# Patient Record
Sex: FEMALE | Race: WHITE | NOT HISPANIC OR LATINO | Employment: OTHER | URBAN - METROPOLITAN AREA
[De-identification: names, ages, dates, MRNs, and addresses within clinical notes are randomized per-mention and may not be internally consistent; named-entity substitution may affect disease eponyms.]

---

## 2017-08-24 ENCOUNTER — OFFICE VISIT (OUTPATIENT)
Dept: UROLOGY | Facility: CLINIC | Age: 57
End: 2017-08-24
Payer: COMMERCIAL

## 2017-08-24 ENCOUNTER — APPOINTMENT (OUTPATIENT)
Dept: LAB | Facility: HOSPITAL | Age: 57
End: 2017-08-24
Attending: UROLOGY
Payer: COMMERCIAL

## 2017-08-24 VITALS
DIASTOLIC BLOOD PRESSURE: 74 MMHG | HEIGHT: 66 IN | TEMPERATURE: 99 F | BODY MASS INDEX: 22.66 KG/M2 | WEIGHT: 141 LBS | SYSTOLIC BLOOD PRESSURE: 115 MMHG | HEART RATE: 76 BPM

## 2017-08-24 DIAGNOSIS — N32.81 OAB (OVERACTIVE BLADDER): Primary | ICD-10-CM

## 2017-08-24 DIAGNOSIS — R31.9 HEMATURIA OF UNDIAGNOSED CAUSE: ICD-10-CM

## 2017-08-24 PROCEDURE — 3008F BODY MASS INDEX DOCD: CPT | Mod: S$GLB,,, | Performed by: UROLOGY

## 2017-08-24 PROCEDURE — 88112 CYTOPATH CELL ENHANCE TECH: CPT | Performed by: PATHOLOGY

## 2017-08-24 PROCEDURE — 99999 PR PBB SHADOW E&M-EST. PATIENT-LVL III: CPT | Mod: PBBFAC,,, | Performed by: UROLOGY

## 2017-08-24 PROCEDURE — 88112 CYTOPATH CELL ENHANCE TECH: CPT | Mod: 26,,, | Performed by: PATHOLOGY

## 2017-08-24 PROCEDURE — 99212 OFFICE O/P EST SF 10 MIN: CPT | Mod: S$GLB,,, | Performed by: UROLOGY

## 2017-08-24 RX ORDER — OXYBUTYNIN CHLORIDE 5 MG/1
5 TABLET, EXTENDED RELEASE ORAL DAILY
Qty: 30 TABLET | Refills: 11 | Status: SHIPPED | OUTPATIENT
Start: 2017-08-24 | End: 2018-11-09 | Stop reason: SDUPTHER

## 2017-08-24 NOTE — ADDENDUM NOTE
Encounter addended by: Kimberly Grant MA on: 8/24/2017 11:54 AM<BR>    Actions taken: Child order released for a procedure order, Multistep and multistep collection tasks completed

## 2017-08-24 NOTE — PROGRESS NOTES
Subjective:       Patient ID: Ani Matthews is a 57 y.o. female.    Chief Complaint:   OFFICE NOTE    CHIEF COMPLAINT:  Microhematuria and history of overactive bladder.    Ms. Matthews is a 57-year-old female who in 2015, she underwent a cystoscopic   evaluation for evaluation of microhematuria and an abnormal urine cytology.  The   only finding was that the patient have an overactive bladder and the patient is   being managed with oxybutynin XL 5 mg p.o. daily, but she takes that only as   needed.  She refers that do not take more than two or three of these medications   every week.  The patient is doing well.  She is not having any significant   problems.  Denies dysuria.  Denies gross hematuria.  She has nocturia x0, have   urgency during the day and frequency during the day.  During the last year, she   do not have a single a urinary tract infection.    The past medical and surgical history are well documented in the medical record   and these and the medications were reviewed by me during this visit.    The urinalysis today shows 1+ leukocyte, negative nitrites, and 2+ blood.      EOR/PN  dd: 08/24/2017 10:05:47 (CDT)  td: 08/24/2017 12:24:44 (CDT)  Doc ID   #[id number]  Job ID #[id number]    CC:       HPI  Review of Systems   Constitutional: Negative.  Negative for activity change.   HENT: Negative.  Negative for facial swelling.    Eyes: Negative for discharge.   Respiratory: Negative for cough and shortness of breath.    Cardiovascular: Negative for chest pain and palpitations.   Gastrointestinal: Negative for abdominal distention, blood in stool and constipation.   Genitourinary: Positive for frequency and urgency. Negative for dysuria, flank pain, hematuria and vaginal pain.   Musculoskeletal: Negative.  Negative for arthralgias.   Skin: Negative.    Neurological: Negative.  Negative for dizziness.   Hematological: Negative for adenopathy.   Psychiatric/Behavioral: The patient is not nervous/anxious.         Objective:      Physical Exam   Constitutional: She appears well-developed.   HENT:   Head: Normocephalic.   Eyes: Pupils are equal, round, and reactive to light.   Neck: Normal range of motion.   Cardiovascular: Normal rate.    Pulmonary/Chest: Effort normal.   Abdominal: Soft. She exhibits no distension and no mass. There is no tenderness. Hernia confirmed negative in the right inguinal area and confirmed negative in the left inguinal area.   Genitourinary: No erythema, tenderness or bleeding in the vagina.   Musculoskeletal: Normal range of motion.   Neurological: She is alert.   Skin: Skin is warm.     Psychiatric: She has a normal mood and affect.       Assessment:       1. OAB (overactive bladder)    2. Hematuria of undiagnosed cause        Plan:       OAB (overactive bladder)  -     POCT URINE DIPSTICK WITHOUT MICROSCOPE  -     Cytology, urine; Future; Expected date: 08/24/2017    Hematuria of undiagnosed cause  -     Cytology, urine; Future; Expected date: 08/24/2017    Other orders  -     oxybutynin (DITROPAN XL) 5 MG TR24; Take 1 tablet (5 mg total) by mouth once daily.  Dispense: 30 tablet; Refill: 11    Keep same management. RTC 1 yr

## 2018-11-12 RX ORDER — OXYBUTYNIN CHLORIDE 5 MG/1
5 TABLET, EXTENDED RELEASE ORAL DAILY
Qty: 90 TABLET | Refills: 0 | Status: SHIPPED | OUTPATIENT
Start: 2018-11-12 | End: 2019-02-10

## 2018-11-12 RX ORDER — OXYBUTYNIN CHLORIDE 5 MG/1
TABLET, EXTENDED RELEASE ORAL
Qty: 30 TABLET | Refills: 0 | Status: SHIPPED | OUTPATIENT
Start: 2018-11-12 | End: 2018-11-12 | Stop reason: SDUPTHER

## 2018-11-12 NOTE — TELEPHONE ENCOUNTER
----- Message from Nohelia Roche sent at 11/12/2018 10:01 AM CST -----  Please call pt at  [phone]  Asking for 1 refill for oxybutynin (DITROPAN XL) 5 MG TR24  / not sure who her new insurance will allow her to see   [id number] - [place] - [address]  [address] [address]  Phone: [phone] Fax: [phone]

## 2019-02-19 ENCOUNTER — OFFICE VISIT (OUTPATIENT)
Dept: UROGYNECOLOGY | Facility: CLINIC | Age: 59
End: 2019-02-19
Payer: COMMERCIAL

## 2019-02-19 ENCOUNTER — APPOINTMENT (OUTPATIENT)
Dept: LAB | Facility: HOSPITAL | Age: 59
End: 2019-02-19
Attending: OBSTETRICS & GYNECOLOGY
Payer: COMMERCIAL

## 2019-02-19 VITALS
DIASTOLIC BLOOD PRESSURE: 79 MMHG | WEIGHT: 140.19 LBS | BODY MASS INDEX: 22.53 KG/M2 | SYSTOLIC BLOOD PRESSURE: 117 MMHG | HEART RATE: 96 BPM | HEIGHT: 66 IN

## 2019-02-19 DIAGNOSIS — N36.2 URETHRAL CARUNCLE: ICD-10-CM

## 2019-02-19 DIAGNOSIS — R35.0 URINARY FREQUENCY: ICD-10-CM

## 2019-02-19 DIAGNOSIS — R31.21 ASYMPTOMATIC MICROSCOPIC HEMATURIA: Primary | ICD-10-CM

## 2019-02-19 DIAGNOSIS — N32.81 OVERACTIVE BLADDER: ICD-10-CM

## 2019-02-19 LAB
BILIRUB SERPL-MCNC: ABNORMAL MG/DL
BLOOD URINE, POC: ABNORMAL
COLOR, POC UA: YELLOW
GLUCOSE UR QL STRIP: ABNORMAL
KETONES UR QL STRIP: ABNORMAL
LEUKOCYTE ESTERASE URINE, POC: ABNORMAL
NITRITE, POC UA: ABNORMAL
PH, POC UA: 5
PROTEIN, POC: ABNORMAL
SPECIFIC GRAVITY, POC UA: 1.02
UROBILINOGEN, POC UA: ABNORMAL

## 2019-02-19 PROCEDURE — 81002 POCT URINE DIPSTICK WITHOUT MICROSCOPE: ICD-10-PCS | Mod: S$GLB,,, | Performed by: OBSTETRICS & GYNECOLOGY

## 2019-02-19 PROCEDURE — 81002 URINALYSIS NONAUTO W/O SCOPE: CPT | Mod: S$GLB,,, | Performed by: OBSTETRICS & GYNECOLOGY

## 2019-02-19 PROCEDURE — 99212 PR OFFICE/OUTPT VISIT, EST, LEVL II, 10-19 MIN: ICD-10-PCS | Mod: 25,S$GLB,, | Performed by: OBSTETRICS & GYNECOLOGY

## 2019-02-19 PROCEDURE — 88112 CYTOPATH CELL ENHANCE TECH: CPT | Performed by: PATHOLOGY

## 2019-02-19 PROCEDURE — 99999 PR PBB SHADOW E&M-EST. PATIENT-LVL III: ICD-10-PCS | Mod: PBBFAC,,, | Performed by: OBSTETRICS & GYNECOLOGY

## 2019-02-19 PROCEDURE — 88112 CYTOPATH CELL ENHANCE TECH: CPT | Mod: 26,,, | Performed by: PATHOLOGY

## 2019-02-19 PROCEDURE — 99212 OFFICE O/P EST SF 10 MIN: CPT | Mod: 25,S$GLB,, | Performed by: OBSTETRICS & GYNECOLOGY

## 2019-02-19 PROCEDURE — 99999 PR PBB SHADOW E&M-EST. PATIENT-LVL III: CPT | Mod: PBBFAC,,, | Performed by: OBSTETRICS & GYNECOLOGY

## 2019-02-19 PROCEDURE — 88112 CYTOLOGY SPECIMEN-URINE: ICD-10-PCS | Mod: 26,,, | Performed by: PATHOLOGY

## 2019-02-19 RX ORDER — OXYBUTYNIN CHLORIDE 5 MG/1
5 TABLET ORAL DAILY PRN
Qty: 30 TABLET | Refills: 2 | Status: SHIPPED | OUTPATIENT
Start: 2019-02-19 | End: 2020-02-19

## 2019-02-19 RX ORDER — OXYBUTYNIN CHLORIDE 5 MG/1
5 TABLET, EXTENDED RELEASE ORAL DAILY
Qty: 30 TABLET | Refills: 12 | Status: SHIPPED | OUTPATIENT
Start: 2019-02-19 | End: 2019-03-21

## 2019-02-19 NOTE — PROGRESS NOTES
Subjective:     Chief Complaint:  Overactive bladder  History of Present Illness: Ani Matthews is a 58 y.o. female who presents for overactive bladder and idiopathic hematuria.   did cystoscopy in 2015 and it was unremarkable.  She has been getting cytology annually and they have all been negative.  Her urinalysis typically shows just trace of blood.  She has no history of UTIs.  She did have overactive bladder and was being managed on oxybutynin.  She thinks that is a wonderful drug but she is very anti medication.  She only takes it about twice a month when she is going to be out for a long time.  While she still has no incontinence, she is noticing more frequency or that she gets urge before the end of the day.  When she is off the medications she says she urinating constantly.  She denies any symptoms of UTIs.  She has no visible blood.  Urinalysis today is negative      Review of Systems    Constitutional: No acute distress. No weight gain/loss.  Eyes: No vision changes.  ENT: No headaches.   Respiratory: No SOB.  Cardiovascular: No chest pain. No edema.   Gastrointestinal: No constipation. No diarrhea. No fecal incontinence.   Genitourinary:  Postmenopausal  Integument/Breast: Negative  Hematologic/Lymphatic: No history of anemia.  Musculoskeletal:  Osteopenia  Neurological: No disc problems. No paresthesias.  Behavioral/Psych:  On buspirone  Endocrine: No hormonal replacement.  Allergy/Immune: no known allergies     Objective:   General Exam:  General appearance: WDNF. NAD.   HEENT: Ilana. EOM's intact.  Neck: Normal thyroid.   Back: No CVA tenderness.  RESP: No SOB.  Breasts: deferred  Abdomen: Benign without localizing signs.  Extremities: No edema. No varices.  Lymphatic: noncontributory  Skin: No rashes. No lesions.  Neurologic: Intact.   Psych: Oriented.   Pelvic Exam:  V:  No lesions. No palpable nodes.   Va:No d/c bleeding or lesions.   .Meatus:  Very early caruncle formation  Urethra: Non  tender. No suburethral masses.  Cx/Cuff: Normal   Uterus:  Anteverted and nontender  Ad: No mass or tenderness.  Levators :Symmetrical. Normal tone. Non tender.  BL: Non tender  RV: No hemorrhoids.  Assessment:   We discussed her options including increasing the dose of the oxybutynin ER, changing medications, are given her immediate release oxybutynin to boost the effect if needed.  She said she would prefer the 3rd option.       Plan:    Urine cytology  Prescribed 5 mg immediate oxybutynin for p.r.n. use

## 2019-03-14 ENCOUNTER — TELEPHONE (OUTPATIENT)
Dept: UROGYNECOLOGY | Facility: CLINIC | Age: 59
End: 2019-03-14

## 2019-03-14 NOTE — TELEPHONE ENCOUNTER
----- Message from Zoila Winslow sent at 3/14/2019 12:18 PM CDT -----  Contact: self [phone]  She is requesting that the lab test results be sent to Dr Bosch.  Thank you!

## 2019-03-28 ENCOUNTER — TELEPHONE (OUTPATIENT)
Dept: UROGYNECOLOGY | Facility: CLINIC | Age: 59
End: 2019-03-28

## 2019-03-28 NOTE — TELEPHONE ENCOUNTER
----- Message from Miryam Roberts sent at 3/28/2019  8:59 AM CDT -----  Type: Needs Medical Advice    Who Called:  Patient  Best Call Back Number: [phone]  Additional Information: Needs to talk to office concerning a code that you used for one of her office visit on 2/19/19. Please call to advise.

## 2019-03-28 NOTE — TELEPHONE ENCOUNTER
"States that she came in for her yearly checkup for 2/19/19 and she states that it was not coded as a yearly check up, states that they are charging her 256.00 for this visit. Tried to explain to her that if there was a problem addressed that day, and she quickly said "No" that  she did not have any blood that day and this was a annual check up. Would like the Dr to correct this.  "

## 2019-04-01 NOTE — TELEPHONE ENCOUNTER
"We saw her for the annual surveillance and cytology that Dr. Dominguez started and for follow-up of her overactive bladder. The only annual exam" codes I know of or the routine annual physicals that primary care does or well woman visits that a general OBGYN does.  I looked up the code  used the last 2 times he saw her(not coded as an annual visit but as 26912) and we will see if we can change the code to see if that reduces her charge.  "

## 2019-10-09 ENCOUNTER — CLINICAL SUPPORT (OUTPATIENT)
Dept: FAMILY MEDICINE | Facility: CLINIC | Age: 59
End: 2019-10-09
Payer: COMMERCIAL

## 2019-10-09 DIAGNOSIS — Z23 FLU VACCINE NEED: Primary | ICD-10-CM

## 2019-10-09 PROCEDURE — 90471 IMMUNIZATION ADMIN: CPT | Mod: S$GLB,,, | Performed by: PHYSICIAN ASSISTANT

## 2019-10-09 PROCEDURE — 90682 RIV4 VACC RECOMBINANT DNA IM: CPT | Mod: S$GLB,,, | Performed by: PHYSICIAN ASSISTANT

## 2019-10-09 PROCEDURE — 90682 FLU VACCINE - QUADRIVALENT (RECOMBINANT) PRESERVATIVE FREE: ICD-10-PCS | Mod: S$GLB,,, | Performed by: PHYSICIAN ASSISTANT

## 2019-10-09 PROCEDURE — 90471 FLU VACCINE - QUADRIVALENT (RECOMBINANT) PRESERVATIVE FREE: ICD-10-PCS | Mod: S$GLB,,, | Performed by: PHYSICIAN ASSISTANT

## 2019-11-12 ENCOUNTER — TELEPHONE (OUTPATIENT)
Dept: FAMILY MEDICINE | Facility: CLINIC | Age: 59
End: 2019-11-12

## 2019-11-12 NOTE — TELEPHONE ENCOUNTER
Patient left voicemail that she got a bill from [facility] for labs drawn about 3 months ago and that it needs to be coded as wellness. I told her you were off until Thursday and would call her then.

## 2019-11-14 NOTE — TELEPHONE ENCOUNTER
Spoke to patient regarding her [facility] bill for $43.88 from her March lab work.  I had spoken to her in August and sent in the Z00.00 code for Wellness.  I called [facility] billing again today and spoke to Kurt who says that the current bill is only for copays & that her insurance did not deny any of her tests.  The patient will call the insurance company to make sure the bill is correct.    I cannot do any more for her and she understands that.

## 2019-11-15 NOTE — TELEPHONE ENCOUNTER
Spoke to patient again re: [facility]  She says that her Insurance company wants notes from her office visit sent to them in order to cover the co-pays.  Notes faxed to [facility] #[phone] attn:Juana.  Patient aware.ba

## 2020-01-02 ENCOUNTER — OFFICE VISIT (OUTPATIENT)
Dept: FAMILY MEDICINE | Facility: CLINIC | Age: 60
End: 2020-01-02
Payer: COMMERCIAL

## 2020-01-02 VITALS
DIASTOLIC BLOOD PRESSURE: 74 MMHG | WEIGHT: 148 LBS | TEMPERATURE: 99 F | HEART RATE: 72 BPM | SYSTOLIC BLOOD PRESSURE: 136 MMHG | BODY MASS INDEX: 23.89 KG/M2

## 2020-01-02 DIAGNOSIS — J00 ACUTE NASOPHARYNGITIS: Primary | ICD-10-CM

## 2020-01-02 DIAGNOSIS — R05.9 COUGH: ICD-10-CM

## 2020-01-02 PROCEDURE — 99212 OFFICE O/P EST SF 10 MIN: CPT | Mod: S$GLB,,, | Performed by: NURSE PRACTITIONER

## 2020-01-02 PROCEDURE — 99212 PR OFFICE/OUTPT VISIT, EST, LEVL II, 10-19 MIN: ICD-10-PCS | Mod: S$GLB,,, | Performed by: NURSE PRACTITIONER

## 2020-01-02 RX ORDER — BENZONATATE 200 MG/1
200 CAPSULE ORAL 3 TIMES DAILY PRN
Qty: 30 CAPSULE | Refills: 1 | Status: SHIPPED | OUTPATIENT
Start: 2020-01-02 | End: 2020-01-12

## 2020-01-02 RX ORDER — LANOLIN ALCOHOL/MO/W.PET/CERES
CREAM (GRAM) TOPICAL
COMMUNITY

## 2020-01-02 NOTE — PATIENT INSTRUCTIONS
Viral Upper Respiratory Illness (Adult)  You have a viral upper respiratory illness (URI), which is another term for the common cold. This illness is contagious during the first few days. It is spread through the air by coughing and sneezing. It may also be spread by direct contact (touching the sick person and then touching your own eyes, nose, or mouth). Frequent handwashing will decrease risk of spread. Most viral illnesses go away within 7 to 10 days with rest and simple home remedies. Sometimes the illness may last for several weeks. Antibiotics will not kill a virus, and they are generally not prescribed for this condition.    Home care  · If symptoms are severe, rest at home for the first 2 to 3 days. When you resume activity, don't let yourself get too tired.  · Avoid being exposed to cigarette smoke (yours or others).  · You may use acetaminophen or ibuprofen to control pain and fever, unless another medicine was prescribed. (Note: If you have chronic liver or kidney disease, have ever had a stomach ulcer or gastrointestinal bleeding, or are taking blood-thinning medicines, talk with your healthcare provider before using these medicines.) Aspirin should never be given to anyone under 18 years of age who is ill with a viral infection or fever. It may cause severe liver or brain damage.  · Your appetite may be poor, so a light diet is fine. Avoid dehydration by drinking 6 to 8 glasses of fluids per day (water, soft drinks, juices, tea, or soup). Extra fluids will help loosen secretions in the nose and lungs.  · Over-the-counter cold medicines will not shorten the length of time youre sick, but they may be helpful for the following symptoms: cough, sore throat, and nasal and sinus congestion. (Note: Do not use decongestants if you have high blood pressure.)  Follow-up care  Follow up with your healthcare provider, or as advised.  When to seek medical advice  Call your healthcare provider right away if any  of these occur:  · Cough with lots of colored sputum (mucus)  · Severe headache; face, neck, or ear pain  · Difficulty swallowing due to throat pain  · Fever of 100.4°F (38°C)  Call 911, or get immediate medical care  Call emergency services right away if any of these occur:  · Chest pain, shortness of breath, wheezing, or difficulty breathing  · Coughing up blood  · Inability to swallow due to throat pain  Date Last Reviewed: 9/13/2015  © 1245-9952 [employer]. [address] [place] [address]. All rights reserved. This information is not intended as a substitute for professional medical care. Always follow your healthcare professional's instructions.

## 2020-01-02 NOTE — PROGRESS NOTES
SUBJECTIVE:    Patient ID: Ani Matthews is a 59 y.o. female.    Chief Complaint: Cough (did not bring bottles)    Presents for c/o cold-like symptoms. Started last Tuesday with sore throat, postnasal drip, nonproductive cough, and nasal congestion. Denies fever, chills, sinus pain or pressure. Has taken Coricidin for symptoms. Has no taken any decongestants or cough medications.        No visits with results within 6 Month(s) from this visit.   Latest known visit with results is:   Office Visit on 02/19/2019   Component Date Value Ref Range Status    Color, UA 02/19/2019 yellow   Final    Spec Grav UA 02/19/2019 1.025   Final    pH, UA 02/19/2019 5   Final    WBC, UA 02/19/2019 neg   Final    Nitrite, UA 02/19/2019 neg   Final    Protein 02/19/2019 trace   Final    Glucose, UA 02/19/2019 norm   Final    Ketones, UA 02/19/2019 neg   Final    Urobilinogen, UA 02/19/2019 norm   Final    Bilirubin 02/19/2019 neg   Final    Blood, UA 02/19/2019 neg   Final       Past Medical History:   Diagnosis Date    Osteoporosis, unspecified     Overactive bladder      No past surgical history on file.  No family history on file.    Marital Status:   Alcohol History:  has no alcohol history on file.  Tobacco History:  reports that she has never smoked. She does not have any smokeless tobacco history on file.  Drug History:  has no drug history on file.    Review of patient's allergies indicates:  No Known Allergies    Current Outpatient Medications:     busPIRone (BUSPAR) 10 MG tablet, Take 10 mg by mouth 3 (three) times daily as needed., Disp: , Rfl:     calcium citrate-vitamin D3 315-200 mg (CALCIUM CITRATE + D) 315-200 mg-unit per tablet, 1 tablet with meals, Disp: , Rfl:     oxybutynin (DITROPAN) 5 MG Tab, Take 1 tablet (5 mg total) by mouth daily as needed., Disp: 30 tablet, Rfl: 2    benzonatate (TESSALON) 200 MG capsule, Take 1 capsule (200 mg total) by mouth 3 (three) times daily as needed for  Cough., Disp: 30 capsule, Rfl: 1    Review of Systems   Constitutional: Negative for chills and fever.   HENT: Positive for congestion, postnasal drip and sore throat. Negative for ear pain, sinus pressure and sinus pain.    Eyes: Negative for pain and redness.   Respiratory: Positive for cough. Negative for shortness of breath and wheezing.    Cardiovascular: Negative for chest pain.   Gastrointestinal: Negative for nausea and vomiting.   Genitourinary: Positive for dysuria.   Musculoskeletal: Negative for myalgias and neck pain.   Skin: Negative.    Neurological: Negative for dizziness, weakness and headaches.          Objective:      Vitals:    01/02/20 1025   BP: 136/74   Pulse: 72   Temp: 99.1 °F (37.3 °C)   Weight: 67.1 kg (148 lb)     Body mass index is 23.89 kg/m².  Physical Exam   Constitutional: She appears well-developed and well-nourished.   HENT:   Head: Normocephalic.   Right Ear: Tympanic membrane normal.   Left Ear: Tympanic membrane normal.   Nose: Mucosal edema present.   Mouth/Throat: Oropharynx is clear and moist.   Eyes: Pupils are equal, round, and reactive to light.   Neck: Normal range of motion.   Cardiovascular: Normal rate and regular rhythm.   Pulmonary/Chest: Effort normal. She has no wheezes.   Lymphadenopathy:     She has no cervical adenopathy.   Neurological: She is alert.   Skin: Skin is warm and dry.   Psychiatric: She has a normal mood and affect.         Assessment:       1. Acute nasopharyngitis    2. Cough         Plan:       Acute nasopharyngitis  - Likely viral in nature. No need for abx at this time. Supportive care- otc NSAIDs or tylenol, cough medications, decongestants.    Cough  -     benzonatate (TESSALON) 200 MG capsule; Take 1 capsule (200 mg total) by mouth 3 (three) times daily as needed for Cough.  Dispense: 30 capsule; Refill: 1      Follow up if symptoms worsen or fail to improve, fever >100, productive cough with purulent sputum.

## 2020-03-09 ENCOUNTER — OFFICE VISIT (OUTPATIENT)
Dept: UROGYNECOLOGY | Facility: CLINIC | Age: 60
End: 2020-03-09
Payer: COMMERCIAL

## 2020-03-09 VITALS
HEIGHT: 66 IN | HEART RATE: 83 BPM | SYSTOLIC BLOOD PRESSURE: 140 MMHG | RESPIRATION RATE: 18 BRPM | TEMPERATURE: 98 F | BODY MASS INDEX: 23.77 KG/M2 | WEIGHT: 147.94 LBS | DIASTOLIC BLOOD PRESSURE: 77 MMHG

## 2020-03-09 DIAGNOSIS — R31.21 ASYMPTOMATIC MICROSCOPIC HEMATURIA: ICD-10-CM

## 2020-03-09 DIAGNOSIS — N32.81 OAB (OVERACTIVE BLADDER): ICD-10-CM

## 2020-03-09 DIAGNOSIS — R35.0 URINARY FREQUENCY: Primary | ICD-10-CM

## 2020-03-09 LAB
BILIRUB SERPL-MCNC: ABNORMAL MG/DL
BLOOD URINE, POC: ABNORMAL
COLOR, POC UA: YELLOW
GLUCOSE UR QL STRIP: ABNORMAL
KETONES UR QL STRIP: ABNORMAL
LEUKOCYTE ESTERASE URINE, POC: ABNORMAL
NITRITE, POC UA: ABNORMAL
PH, POC UA: 6
PROTEIN, POC: ABNORMAL
SPECIFIC GRAVITY, POC UA: 1
UROBILINOGEN, POC UA: ABNORMAL

## 2020-03-09 PROCEDURE — 99999 PR PBB SHADOW E&M-EST. PATIENT-LVL III: CPT | Mod: PBBFAC,,, | Performed by: OBSTETRICS & GYNECOLOGY

## 2020-03-09 PROCEDURE — 99212 OFFICE O/P EST SF 10 MIN: CPT | Mod: 25,S$GLB,, | Performed by: OBSTETRICS & GYNECOLOGY

## 2020-03-09 PROCEDURE — 99212 PR OFFICE/OUTPT VISIT, EST, LEVL II, 10-19 MIN: ICD-10-PCS | Mod: 25,S$GLB,, | Performed by: OBSTETRICS & GYNECOLOGY

## 2020-03-09 PROCEDURE — 99999 PR PBB SHADOW E&M-EST. PATIENT-LVL III: ICD-10-PCS | Mod: PBBFAC,,, | Performed by: OBSTETRICS & GYNECOLOGY

## 2020-03-09 PROCEDURE — 81002 URINALYSIS NONAUTO W/O SCOPE: CPT | Mod: S$GLB,,, | Performed by: OBSTETRICS & GYNECOLOGY

## 2020-03-09 PROCEDURE — 81002 POCT URINE DIPSTICK WITHOUT MICROSCOPE: ICD-10-PCS | Mod: S$GLB,,, | Performed by: OBSTETRICS & GYNECOLOGY

## 2020-03-09 RX ORDER — OXYBUTYNIN CHLORIDE 5 MG/1
5 TABLET ORAL DAILY PRN
Qty: 30 TABLET | Refills: 6 | Status: SHIPPED | OUTPATIENT
Start: 2020-03-09 | End: 2020-06-16 | Stop reason: SDUPTHER

## 2020-03-09 RX ORDER — OXYBUTYNIN CHLORIDE 5 MG/1
5 TABLET, EXTENDED RELEASE ORAL DAILY
Qty: 30 TABLET | Refills: 12 | Status: SHIPPED | OUTPATIENT
Start: 2020-03-09 | End: 2020-04-08

## 2020-03-09 NOTE — PROGRESS NOTES
Subjective:     Chief Complaint:  Overactive bladder  History of Present Illness: Ani Matthews is a 59 y.o. female who presents for overactive bladder and idiopathic hematuria.  Dr. Dominguez did a cystoscope several years ago for microscopic hematuria and it was normal.  She has had annual cytology which have always been normal.  Her urinalysis is only showed trace of blood.  The urinalysis last year was normal as is today's.  She has overactive bladder with moderate response to extended release oxybutynin 5 mg.  She says it does not last 24 hr however and she often uses 5 mg immediate release to boost the response in the evening or if she is going out for social events.  We had a lengthy discussion about raising the dose of the extended release but she wants to use the lowest total dose of medications possible and prefers to continue the 5 mg augmented by immediate release 5 mg tablets when needed.    Review of Systems    Constitutional: No acute distress. No weight gain/loss.  Eyes: No vision changes.  ENT: No headaches.   Respiratory:  Nonsmoker  Cardiovascular: No chest pain. No edema.   Gastrointestinal: GERD  Genitourinary: No vaginal bleeding or discharge.  Integument/Breast: Negative  Hematologic/Lymphatic: No history of anemia.  Musculoskeletal: No major back pain. No abdominal pain.  Neurological: No known disc problems. No paresthesias.  Behavioral/Psych:  Insomnia  Endocrine: No hormonal replacement.  Allergy/Immune: no recent reactions     Objective:   General Exam:  General appearance: WDNF. NAD.   HEENT: Ilana. EOM's intact.  Neck: Normal thyroid.   Back: No CVA tenderness.  RESP: No SOB.  Breasts: deferred  Abdomen: Benign without localizing signs.  Extremities: No edema. No varices.  Lymphatic: noncontributory  Skin: No rashes. No lesions.  Neurologic: Intact.   Psych: Oriented.       Assessment:   Overactive bladder.  She would like to use the lowest total dose of oxybutynin possible and from a  cost standpoint the present regimen seems most logical although it is somewhat unusual  She has not had any urinalysis positive for blood in the last 2 years and is not had any positive cytology for several years.  We discussed that she probably does not have to continue annual cytology in less symptoms or abnormal urinalysis shows up       Plan:      Will refill her oxybutynin  If she does not have any further urinalysis that shows any blood she can probably dispense with the annual cytology

## 2020-03-12 ENCOUNTER — TELEPHONE (OUTPATIENT)
Dept: FAMILY MEDICINE | Facility: CLINIC | Age: 60
End: 2020-03-12

## 2020-03-12 DIAGNOSIS — Z00.00 ROUTINE GENERAL MEDICAL EXAMINATION AT A HEALTH CARE FACILITY: Primary | ICD-10-CM

## 2020-03-12 DIAGNOSIS — Z79.899 ENCOUNTER FOR LONG-TERM (CURRENT) USE OF OTHER MEDICATIONS: ICD-10-CM

## 2020-03-12 NOTE — TELEPHONE ENCOUNTER
Spoke with pt and let them know we are putting in labs to be done before the office visit.      Agrees to go to [facility].

## 2020-03-12 NOTE — TELEPHONE ENCOUNTER
Patient called because we have entered orders for her to have yearly labs. She states that when she had labs drawn last year the insurance would not cover them because they were coded wrong. She wants to be sure that it is coded correctly this time, but she does not know how it should be coded. She said she is still fighting with insurance company to get labs paid for from last year. Said that the new codes for March 2019 labs were just entered correctly in August of 2019. She wanted to know if Jessica could check last year's new codes and see if that is what we have coded for this year.

## 2020-03-24 ENCOUNTER — TELEPHONE (OUTPATIENT)
Dept: FAMILY MEDICINE | Facility: CLINIC | Age: 60
End: 2020-03-24

## 2020-03-26 ENCOUNTER — OFFICE VISIT (OUTPATIENT)
Dept: FAMILY MEDICINE | Facility: CLINIC | Age: 60
End: 2020-03-26
Payer: COMMERCIAL

## 2020-03-26 ENCOUNTER — PATIENT MESSAGE (OUTPATIENT)
Dept: FAMILY MEDICINE | Facility: CLINIC | Age: 60
End: 2020-03-26

## 2020-03-26 DIAGNOSIS — M81.0 AGE-RELATED OSTEOPOROSIS WITHOUT CURRENT PATHOLOGICAL FRACTURE: ICD-10-CM

## 2020-03-26 DIAGNOSIS — N32.81 OVERACTIVE BLADDER: ICD-10-CM

## 2020-03-26 DIAGNOSIS — R31.9 HEMATURIA OF UNDIAGNOSED CAUSE: ICD-10-CM

## 2020-03-26 DIAGNOSIS — Z79.899 OTHER LONG TERM (CURRENT) DRUG THERAPY: ICD-10-CM

## 2020-03-26 DIAGNOSIS — F41.1 GENERALIZED ANXIETY DISORDER: Primary | ICD-10-CM

## 2020-03-26 DIAGNOSIS — K21.9 GASTROESOPHAGEAL REFLUX DISEASE WITHOUT ESOPHAGITIS: ICD-10-CM

## 2020-03-26 DIAGNOSIS — E78.2 MIXED HYPERLIPIDEMIA: ICD-10-CM

## 2020-03-26 PROBLEM — E78.5 HYPERLIPIDEMIA: Status: ACTIVE | Noted: 2017-12-21

## 2020-03-26 PROBLEM — F51.01 PRIMARY INSOMNIA: Status: ACTIVE | Noted: 2017-03-14

## 2020-03-26 PROCEDURE — 99213 PR OFFICE/OUTPT VISIT, EST, LEVL III, 20-29 MIN: ICD-10-PCS | Mod: 95,,, | Performed by: FAMILY MEDICINE

## 2020-03-26 PROCEDURE — 99213 OFFICE O/P EST LOW 20 MIN: CPT | Mod: 95,,, | Performed by: FAMILY MEDICINE

## 2020-03-26 RX ORDER — BUSPIRONE HYDROCHLORIDE 10 MG/1
10 TABLET ORAL 3 TIMES DAILY PRN
Qty: 90 TABLET | Refills: 3 | Status: SHIPPED | OUTPATIENT
Start: 2020-03-26 | End: 2021-06-23 | Stop reason: SDUPTHER

## 2020-03-26 NOTE — PROGRESS NOTES
SUBJECTIVE:    Patient ID: Ani Matthews is a 59 y.o. female.    Chief Complaint: No chief complaint on file.    This 59-year-old female is here for a telemedicine visit.  She states she has been sheltering in place at her home for the covid-19 virus crisis.  She does go visit her parents at their home daily to provide for their  needs.  She feels anxious but has no upper respiratory symptoms.  She has no cough congestion or fever.  She worries that her  who works for [employer], has to go into the grocery stores daily and may be exposed to the virus.    She saw Dr. James Owens for her urinary incontinence and microscopic hematuria.  In March 2020 he stated that she had not had blood in her urine for the last 2 years.  She is now released to the care of her primary physician to follow yearly UAs and to come back and  See him  only if she has recurrence hematuria.  She takes Oxybutynin  ER 5 mg q.day and oxybutynin 5 mg regular tablet p.r.n. breakthrough.      Office Visit on 03/09/2020   Component Date Value Ref Range Status    Color, UA 03/09/2020 yellow   Final    Spec Grav UA 03/09/2020 1.000   Final    pH, UA 03/09/2020 6   Final    WBC, UA 03/09/2020 neg   Final    Nitrite, UA 03/09/2020 neg   Final    Protein 03/09/2020 neg   Final    Glucose, UA 03/09/2020 norm   Final    Ketones, UA 03/09/2020 neg   Final    Urobilinogen, UA 03/09/2020 nrom   Final    Bilirubin 03/09/2020 neg   Final    Blood, UA 03/09/2020 neg   Final       Past Medical History:   Diagnosis Date    Osteoporosis, unspecified     Overactive bladder      No past surgical history on file.  No family history on file.    Marital Status:   Alcohol History:  has no alcohol history on file.  Tobacco History:  reports that she has never smoked. She does not have any smokeless tobacco history on file.  Drug History:  has no drug history on file.    Review of patient's allergies indicates:  No Known  Allergies    Current Outpatient Medications:     busPIRone (BUSPAR) 10 MG tablet, Take 1 tablet (10 mg total) by mouth 3 (three) times daily as needed., Disp: 90 tablet, Rfl: 3    calcium citrate-vitamin D3 315-200 mg (CALCIUM CITRATE + D) 315-200 mg-unit per tablet, 1 tablet with meals, Disp: , Rfl:     oxybutynin (DITROPAN) 5 MG Tab, Take 1 tablet (5 mg total) by mouth daily as needed. Take 1 tablet daily if needed to boost the effect of the extended release tablet, Disp: 30 tablet, Rfl: 6    oxybutynin (DITROPAN-XL) 5 MG TR24, Take 1 tablet (5 mg total) by mouth once daily., Disp: 30 tablet, Rfl: 12    Review of Systems   Constitutional: Negative for appetite change, chills, fatigue, fever and unexpected weight change.   HENT: Negative for congestion, ear pain, sinus pain, sore throat and trouble swallowing.    Eyes: Negative for pain, discharge and visual disturbance.   Respiratory: Negative for apnea, cough, shortness of breath and wheezing.    Cardiovascular: Negative for chest pain, palpitations and leg swelling.   Gastrointestinal: Negative for abdominal pain, blood in stool, constipation (Miralax daily), diarrhea, nausea and vomiting.   Endocrine: Negative for heat intolerance, polydipsia and polyuria.   Genitourinary: Negative for difficulty urinating, dyspareunia, dysuria, frequency, hematuria and menstrual problem.   Musculoskeletal: Negative for arthralgias, back pain, gait problem, joint swelling and myalgias.   Allergic/Immunologic: Negative for environmental allergies, food allergies and immunocompromised state.   Neurological: Negative for dizziness, tremors, seizures, numbness and headaches.   Psychiatric/Behavioral: Negative for behavioral problems, confusion, hallucinations and suicidal ideas. The patient is not nervous/anxious (She is anxious about her parents Health, and other 's risk of catching virus.).         Less exhausted due to not needing to provide  of her  grandkids.   Sleeping better now days          Objective:      There were no vitals filed for this visit.  There is no height or weight on file to calculate BMI.  Physical Exam      Assessment:       1. Generalized anxiety disorder    2. Mixed hyperlipidemia    3. Overactive bladder    4. Hematuria of undiagnosed cause    5. Gastroesophageal reflux disease without esophagitis    6. Age-related osteoporosis without current pathological fracture    7. Other long term (current) drug therapy         Plan:       Generalized anxiety disorder  Somewhat anxious, but using Buspar only p.r.n.  Mixed hyperlipidemia  Labs due in the next 3 months  Overactive bladder  Continue oxybutynin ER 5 mg daily and 5 mg p.r.n. breakthrough  Hematuria of undiagnosed cause  Now released back to primary care for yearly urinalysis for monitoring  Gastroesophageal reflux disease without esophagitis  Minor symptoms  Age-related osteoporosis without current pathological fracture  Continue calcium plus D  Other long term (current) drug therapy      Follow up in about 3 months (around 6/26/2020) for Labs and office visit 3 months, anxiety, hyperlipidemia.      The patient location is:  home  Visit type: Virtual visit with synchronous audio and video    Total time spent with patient: 20     Each patient to whom he or she provides medical services by telemedicine is:  (1) informed of the relationship between the physician and patient and the respective role of any other health care provider with respect to management of the patient; and (2) notified that he or she may decline to receive medical services by telemedicine and may withdraw from such care at any time.     This note was created using [employer] voice recognition software that occasionally misinterprets phrases or words.

## 2020-03-27 ENCOUNTER — TELEPHONE (OUTPATIENT)
Dept: FAMILY MEDICINE | Facility: CLINIC | Age: 60
End: 2020-03-27

## 2020-05-12 ENCOUNTER — TELEPHONE (OUTPATIENT)
Dept: FAMILY MEDICINE | Facility: CLINIC | Age: 60
End: 2020-05-12

## 2020-06-04 ENCOUNTER — TELEPHONE (OUTPATIENT)
Dept: FAMILY MEDICINE | Facility: CLINIC | Age: 60
End: 2020-06-04

## 2020-06-09 ENCOUNTER — TELEPHONE (OUTPATIENT)
Dept: FAMILY MEDICINE | Facility: CLINIC | Age: 60
End: 2020-06-09

## 2020-06-09 LAB
ALBUMIN SERPL-MCNC: 4.4 G/DL (ref 3.6–5.1)
ALBUMIN/GLOB SERPL: 2.2 (CALC) (ref 1–2.5)
ALP SERPL-CCNC: 81 U/L (ref 37–153)
ALT SERPL-CCNC: 7 U/L (ref 6–29)
APPEARANCE UR: CLEAR
AST SERPL-CCNC: 15 U/L (ref 10–35)
BACTERIA #/AREA URNS HPF: ABNORMAL /HPF
BACTERIA UR CULT: ABNORMAL
BASOPHILS # BLD AUTO: 48 CELLS/UL (ref 0–200)
BASOPHILS NFR BLD AUTO: 0.8 %
BILIRUB SERPL-MCNC: 0.5 MG/DL (ref 0.2–1.2)
BILIRUB UR QL STRIP: NEGATIVE
BUN SERPL-MCNC: 15 MG/DL (ref 7–25)
BUN/CREAT SERPL: NORMAL (CALC) (ref 6–22)
CALCIUM SERPL-MCNC: 9.2 MG/DL (ref 8.6–10.4)
CHLORIDE SERPL-SCNC: 103 MMOL/L (ref 98–110)
CHOLEST SERPL-MCNC: 229 MG/DL
CHOLEST/HDLC SERPL: 3.6 (CALC)
CO2 SERPL-SCNC: 29 MMOL/L (ref 20–32)
COLOR UR: YELLOW
CREAT SERPL-MCNC: 0.73 MG/DL (ref 0.5–0.99)
EOSINOPHIL # BLD AUTO: 180 CELLS/UL (ref 15–500)
EOSINOPHIL NFR BLD AUTO: 3 %
ERYTHROCYTE [DISTWIDTH] IN BLOOD BY AUTOMATED COUNT: 12.8 % (ref 11–15)
GFRSERPLBLD MDRD-ARVRAT: 88 ML/MIN/1.73M2
GLOBULIN SER CALC-MCNC: 2 G/DL (CALC) (ref 1.9–3.7)
GLUCOSE SERPL-MCNC: 94 MG/DL (ref 65–99)
GLUCOSE UR QL STRIP: NEGATIVE
HCT VFR BLD AUTO: 41 % (ref 35–45)
HDLC SERPL-MCNC: 63 MG/DL
HGB BLD-MCNC: 13.3 G/DL (ref 11.7–15.5)
HGB UR QL STRIP: ABNORMAL
HYALINE CASTS #/AREA URNS LPF: ABNORMAL /LPF
KETONES UR QL STRIP: NEGATIVE
LDLC SERPL CALC-MCNC: 148 MG/DL (CALC)
LEUKOCYTE ESTERASE UR QL STRIP: NEGATIVE
LYMPHOCYTES # BLD AUTO: 1674 CELLS/UL (ref 850–3900)
LYMPHOCYTES NFR BLD AUTO: 27.9 %
MCH RBC QN AUTO: 30.5 PG (ref 27–33)
MCHC RBC AUTO-ENTMCNC: 32.4 G/DL (ref 32–36)
MCV RBC AUTO: 94 FL (ref 80–100)
MONOCYTES # BLD AUTO: 408 CELLS/UL (ref 200–950)
MONOCYTES NFR BLD AUTO: 6.8 %
NEUTROPHILS # BLD AUTO: 3690 CELLS/UL (ref 1500–7800)
NEUTROPHILS NFR BLD AUTO: 61.5 %
NITRITE UR QL STRIP: NEGATIVE
NONHDLC SERPL-MCNC: 166 MG/DL (CALC)
PH UR STRIP: 7.5 [PH] (ref 5–8)
PLATELET # BLD AUTO: 321 THOUSAND/UL (ref 140–400)
PMV BLD REES-ECKER: 10.3 FL (ref 7.5–12.5)
POTASSIUM SERPL-SCNC: 4.2 MMOL/L (ref 3.5–5.3)
PROT SERPL-MCNC: 6.4 G/DL (ref 6.1–8.1)
PROT UR QL STRIP: NEGATIVE
RBC # BLD AUTO: 4.36 MILLION/UL (ref 3.8–5.1)
RBC #/AREA URNS HPF: ABNORMAL /HPF
SODIUM SERPL-SCNC: 139 MMOL/L (ref 135–146)
SP GR UR STRIP: 1.01 (ref 1–1.03)
SQUAMOUS #/AREA URNS HPF: ABNORMAL /HPF
TRIGL SERPL-MCNC: 79 MG/DL
TSH SERPL-ACNC: 2.41 MIU/L (ref 0.4–4.5)
WBC # BLD AUTO: 6 THOUSAND/UL (ref 3.8–10.8)
WBC #/AREA URNS HPF: ABNORMAL /HPF

## 2020-06-16 ENCOUNTER — OFFICE VISIT (OUTPATIENT)
Dept: FAMILY MEDICINE | Facility: CLINIC | Age: 60
End: 2020-06-16
Payer: COMMERCIAL

## 2020-06-16 VITALS
TEMPERATURE: 98 F | BODY MASS INDEX: 22.82 KG/M2 | DIASTOLIC BLOOD PRESSURE: 76 MMHG | SYSTOLIC BLOOD PRESSURE: 132 MMHG | WEIGHT: 142 LBS | HEART RATE: 72 BPM | HEIGHT: 66 IN

## 2020-06-16 DIAGNOSIS — K21.9 GASTROESOPHAGEAL REFLUX DISEASE WITHOUT ESOPHAGITIS: ICD-10-CM

## 2020-06-16 DIAGNOSIS — E78.2 MIXED HYPERLIPIDEMIA: ICD-10-CM

## 2020-06-16 DIAGNOSIS — Z00.00 WELLNESS EXAMINATION: Primary | ICD-10-CM

## 2020-06-16 DIAGNOSIS — M81.0 AGE-RELATED OSTEOPOROSIS WITHOUT CURRENT PATHOLOGICAL FRACTURE: ICD-10-CM

## 2020-06-16 DIAGNOSIS — F41.1 GENERALIZED ANXIETY DISORDER: ICD-10-CM

## 2020-06-16 DIAGNOSIS — R31.9 HEMATURIA OF UNDIAGNOSED CAUSE: ICD-10-CM

## 2020-06-16 DIAGNOSIS — F51.01 PRIMARY INSOMNIA: ICD-10-CM

## 2020-06-16 DIAGNOSIS — N32.81 OAB (OVERACTIVE BLADDER): ICD-10-CM

## 2020-06-16 DIAGNOSIS — Z12.11 SPECIAL SCREENING FOR MALIGNANT NEOPLASMS, COLON: ICD-10-CM

## 2020-06-16 DIAGNOSIS — N32.81 OVERACTIVE BLADDER: ICD-10-CM

## 2020-06-16 PROCEDURE — 99396 PREV VISIT EST AGE 40-64: CPT | Mod: S$GLB,,, | Performed by: FAMILY MEDICINE

## 2020-06-16 PROCEDURE — 99396 PR PREVENTIVE VISIT,EST,40-64: ICD-10-PCS | Mod: S$GLB,,, | Performed by: FAMILY MEDICINE

## 2020-06-16 RX ORDER — OXYBUTYNIN CHLORIDE 5 MG/1
5 TABLET, EXTENDED RELEASE ORAL DAILY
COMMUNITY
End: 2020-06-16 | Stop reason: SDUPTHER

## 2020-06-16 RX ORDER — OXYBUTYNIN CHLORIDE 5 MG/1
5 TABLET, EXTENDED RELEASE ORAL DAILY
Qty: 30 TABLET | Refills: 5 | Status: SHIPPED | OUTPATIENT
Start: 2020-06-16 | End: 2021-06-23 | Stop reason: SDUPTHER

## 2020-06-16 RX ORDER — BUSPIRONE HYDROCHLORIDE 10 MG/1
10 TABLET ORAL 3 TIMES DAILY PRN
Qty: 90 TABLET | Refills: 1 | Status: CANCELLED | OUTPATIENT
Start: 2020-06-16

## 2020-06-16 RX ORDER — OXYBUTYNIN CHLORIDE 5 MG/1
5 TABLET ORAL DAILY PRN
Qty: 30 TABLET | Refills: 6 | Status: SHIPPED | OUTPATIENT
Start: 2020-06-16 | End: 2021-06-16

## 2020-06-16 NOTE — PROGRESS NOTES
SUBJECTIVE:    Patient ID: Ani Matthews is a 60 y.o. female.    Chief Complaint: Follow-up (brought bottles // refused dexa // mammogram - ordered ac)    This 60-year-old female is here for her wellness visit.  She is staying active by walking 20 or 30 min 7 days a week.  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  Spends half a day in the morning at her parent's house a riding further knees.  She is stressed out by her caregiver role and her parents unwillingness to change their lifestyle habits.  She occasionally will take abuse far when she is feeling anxious.    Microscopic hematuria-Dr. James Owens has worked her up for this in the past and was unremarkable.  She has no symptoms.    [id number].  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      Telephone on 03/12/2020   Component Date Value Ref Range Status    WBC 06/08/2020 6.0  3.8 - 10.8 Thousand/uL Final    RBC 06/08/2020 4.36  3.80 - 5.10 Million/uL Final    Hemoglobin 06/08/2020 13.3  11.7 - 15.5 g/dL Final    Hematocrit 06/08/2020 41.0  35.0 - 45.0 % Final    Mean Corpuscular Volume 06/08/2020 94.0  80.0 - 100.0 fL Final    Mean Corpuscular Hemoglobin 06/08/2020 30.5  27.0 - 33.0 pg Final    Mean Corpuscular Hemoglobin Conc 06/08/2020 32.4  32.0 - 36.0 g/dL Final    RDW 06/08/2020 12.8  11.0 - 15.0 % Final    Platelets 06/08/2020 321  140 - 400 Thousand/uL Final    MPV 06/08/2020 10.3  7.5 - 12.5 fL Final    Neutrophils Absolute 06/08/2020 3,690  1,500 - 7,800 cells/uL Final    Lymph # 06/08/2020 1,674  850 - 3,900 cells/uL Final    Mono # 06/08/2020 408  200 - 950 cells/uL Final    Eos # 06/08/2020 180  15 - 500 cells/uL Final    Baso # 06/08/2020 48  0 - 200 cells/uL Final    Neutrophils Relative 06/08/2020 61.5  % Final    Lymph% 06/08/2020 27.9  % Final    Mono% 06/08/2020 6.8  % Final    Eosinophil% 06/08/2020 3.0  % Final    Basophil% 06/08/2020 0.8  % Final    Glucose 06/08/2020 94  65 - 99 mg/dL Final    BUN, Bld 06/08/2020 15  7 -  25 mg/dL Final    Creatinine 06/08/2020 0.73  0.50 - 0.99 mg/dL Final    eGFR if non African American 06/08/2020 88  > OR = 60 mL/min/1.73m2 Final    eGFR if African American 06/08/2020 102  > OR = 60 mL/min/1.73m2 Final    BUN/Creatinine Ratio 06/08/2020 NOT APPLICABLE  6 - 22 (calc) Final    Sodium 06/08/2020 139  135 - 146 mmol/L Final    Potassium 06/08/2020 4.2  3.5 - 5.3 mmol/L Final    Chloride 06/08/2020 103  98 - 110 mmol/L Final    CO2 06/08/2020 29  20 - 32 mmol/L Final    Calcium 06/08/2020 9.2  8.6 - 10.4 mg/dL Final    Total Protein 06/08/2020 6.4  6.1 - 8.1 g/dL Final    Albumin 06/08/2020 4.4  3.6 - 5.1 g/dL Final    Globulin, Total 06/08/2020 2.0  1.9 - 3.7 g/dL (calc) Final    Albumin/Globulin Ratio 06/08/2020 2.2  1.0 - 2.5 (calc) Final    Total Bilirubin 06/08/2020 0.5  0.2 - 1.2 mg/dL Final    Alkaline Phosphatase 06/08/2020 81  37 - 153 U/L Final    AST 06/08/2020 15  10 - 35 U/L Final    ALT 06/08/2020 7  6 - 29 U/L Final    Cholesterol 06/08/2020 229* <200 mg/dL Final    HDL 06/08/2020 63  > OR = 50 mg/dL Final    Triglycerides 06/08/2020 79  <150 mg/dL Final    LDL Cholesterol 06/08/2020 148* mg/dL (calc) Final    Hdl/Cholesterol Ratio 06/08/2020 3.6  <5.0 (calc) Final    Non HDL Chol. (LDL+VLDL) 06/08/2020 166* <130 mg/dL (calc) Final    TSH 06/08/2020 2.41  0.40 - 4.50 mIU/L Final    Color, UA 06/08/2020 YELLOW  YELLOW Final    Appearance, UA 06/08/2020 CLEAR  CLEAR Final    Specific Bland, UA 06/08/2020 1.013  1.001 - 1.035 Final    pH, UA 06/08/2020 7.5  5.0 - 8.0 Final    Glucose, UA 06/08/2020 NEGATIVE  NEGATIVE Final    Bilirubin, UA 06/08/2020 NEGATIVE  NEGATIVE Final    Ketones, UA 06/08/2020 NEGATIVE  NEGATIVE Final    Occult Blood UA 06/08/2020 TRACE* NEGATIVE Final    Protein, UA 06/08/2020 NEGATIVE  NEGATIVE Final    Nitrite, UA 06/08/2020 NEGATIVE  NEGATIVE Final    Leukocytes, UA 06/08/2020 NEGATIVE  NEGATIVE Final    WBC Casts, UA  06/08/2020 NONE SEEN  < OR = 5 /HPF Final    RBC Casts, UA 06/08/2020 0-2  < OR = 2 /HPF Final    Squam Epithel, UA 06/08/2020 NONE SEEN  < OR = 5 /HPF Final    Bacteria, UA 06/08/2020 NONE SEEN  NONE SEEN /HPF Final    Hyaline Casts, UA 06/08/2020 NONE SEEN  NONE SEEN /LPF Final    Reflexive Urine Culture 06/08/2020 NO CULTURE INDICATED   Final   Office Visit on 03/09/2020   Component Date Value Ref Range Status    Color, UA 03/09/2020 yellow   Final    Spec Grav UA 03/09/2020 1.000   Final    pH, UA 03/09/2020 6   Final    WBC, UA 03/09/2020 neg   Final    Nitrite, UA 03/09/2020 neg   Final    Protein 03/09/2020 neg   Final    Glucose, UA 03/09/2020 norm   Final    Ketones, UA 03/09/2020 neg   Final    Urobilinogen, UA 03/09/2020 nrom   Final    Bilirubin 03/09/2020 neg   Final    Blood, UA 03/09/2020 neg   Final       Past Medical History:   Diagnosis Date    Osteoporosis, unspecified     Overactive bladder      Past Surgical History:   Procedure Laterality Date    COLONOSCOPY  2018    dr geronimo- rtc 5 yr     History reviewed. No pertinent family history.    Marital Status:   Alcohol History:  has no history on file for alcohol.  Tobacco History:  reports that she has never smoked. She does not have any smokeless tobacco history on file.  Drug History:  has no history on file for drug.    Review of patient's allergies indicates:  No Known Allergies    Current Outpatient Medications:     busPIRone (BUSPAR) 10 MG tablet, Take 1 tablet (10 mg total) by mouth 3 (three) times daily as needed., Disp: 90 tablet, Rfl: 3    calcium citrate-vitamin D3 315-200 mg (CALCIUM CITRATE + D) 315-200 mg-unit per tablet, 1 tablet with meals, Disp: , Rfl:     oxybutynin (DITROPAN) 5 MG Tab, Take 1 tablet (5 mg total) by mouth daily as needed. Take 1 tablet daily if needed to boost the effect of the extended release tablet, Disp: 30 tablet, Rfl: 6    oxybutynin (DITROPAN-XL) 5 MG TR24, Take 1 tablet (5  "mg total) by mouth once daily., Disp: 30 tablet, Rfl: 5    Review of Systems   Constitutional: Negative for appetite change, chills, fatigue, fever and unexpected weight change.   HENT: Negative for congestion, ear pain, sinus pain, sore throat and trouble swallowing.    Eyes: Negative for pain, discharge and visual disturbance.   Respiratory: Negative for apnea, cough, shortness of breath and wheezing.    Cardiovascular: Negative for chest pain, palpitations and leg swelling.   Gastrointestinal: Negative for abdominal pain, blood in stool, constipation (miralax helps), diarrhea, nausea and vomiting.   Endocrine: Negative for heat intolerance, polydipsia and polyuria.   Genitourinary: Positive for frequency. Negative for difficulty urinating, dyspareunia, dysuria, hematuria and menstrual problem.        Microscopic hematuria, she does have an overactive bladder and takes oxybutynin ER and plain   Musculoskeletal: Negative for arthralgias, back pain, gait problem, joint swelling and myalgias.   Allergic/Immunologic: Negative for environmental allergies, food allergies and immunocompromised state.   Neurological: Negative for dizziness, tremors, seizures, numbness and headaches.   Psychiatric/Behavioral: Positive for sleep disturbance (1/2 tab benadryl or melatonin ). Negative for behavioral problems, confusion, hallucinations and suicidal ideas. The patient is nervous/anxious.           Objective:      Vitals:    06/16/20 1128   BP: 132/76   Pulse: 72   Temp: 98.1 °F (36.7 °C)   Weight: 64.4 kg (142 lb)   Height: 5' 6" (1.676 m)     Body mass index is 22.92 kg/m².  Physical Exam  Vitals signs and nursing note reviewed.   Constitutional:       Appearance: She is well-developed.   HENT:      Head: Normocephalic and atraumatic.      Right Ear: External ear normal.      Left Ear: External ear normal.      Nose: Nose normal.   Eyes:      Pupils: Pupils are equal, round, and reactive to light.   Neck:      Musculoskeletal: " Normal range of motion and neck supple.      Thyroid: No thyromegaly.      Vascular: No carotid bruit.   Cardiovascular:      Rate and Rhythm: Normal rate and regular rhythm.      Heart sounds: Normal heart sounds. No murmur.   Pulmonary:      Effort: Pulmonary effort is normal.      Breath sounds: Normal breath sounds. No wheezing or rales.   Abdominal:      General: Bowel sounds are normal. There is no distension.      Palpations: Abdomen is soft.      Tenderness: There is no abdominal tenderness.   Musculoskeletal: Normal range of motion.         General: No tenderness or deformity.      Lumbar back: Normal. She exhibits no pain and no spasm.      Comments: Bends 90 degrees at  waist shoulders have full range of motion, knees are slightly crepitant but have good range of motion.  She has no pitting edema to her lower extremities   Lymphadenopathy:      Cervical: No cervical adenopathy.   Skin:     General: Skin is warm and dry.      Findings: No rash.   Neurological:      Mental Status: She is alert and oriented to person, place, and time.      Cranial Nerves: No cranial nerve deficit.      Coordination: Coordination normal.   Psychiatric:         Behavior: Behavior normal.         Thought Content: Thought content normal.         Judgment: Judgment normal.           Assessment:       1. Wellness examination    2. Special screening for malignant neoplasms, colon    3. Generalized anxiety disorder    4. OAB (overactive bladder)    5. Mixed hyperlipidemia    6. Overactive bladder    7. Hematuria of undiagnosed cause    8. Gastroesophageal reflux disease without esophagitis    9. Age-related osteoporosis without current pathological fracture    10. Primary insomnia         Plan:       Wellness examination  Patient has excellent exercise habits.  She was encouraged to reduce her stress by visiting her parents every other day rather than daily .  Special screening for malignant neoplasms, colon  -     Mammo [facility]  Screening J[facility]/ [facility]; Future; Expected date: 06/16/2020  Mammogram or  Generalized anxiety disorder   She rarely uses Buspar  OAB (overactive bladder)  -     oxybutynin (DITROPAN-XL) 5 MG TR24; Take 1 tablet (5 mg total) by mouth once daily.  Dispense: 30 tablet; Refill: 5  -     oxybutynin (DITROPAN) 5 MG Tab; Take 1 tablet (5 mg total) by mouth daily as needed. Take 1 tablet daily if needed to boost the effect of the extended release tablet  Dispense: 30 tablet; Refill: 6  Continue current oxybutynin dosages  Mixed hyperlipidemia  Cholesterol 229 triglycerides 79.  She has been eating too much ice cream lately.  She agrees to cut back  Overactive bladder    Hematuria of undiagnosed cause  Repeat UA in 3 months  Gastroesophageal reflux disease without esophagitis    Age-related osteoporosis without current pathological fracture    Primary insomnia  Continue over-the-counter meds for sleep    Follow up in about 1 year (around 6/16/2021).

## 2020-07-27 ENCOUNTER — TELEPHONE (OUTPATIENT)
Dept: FAMILY MEDICINE | Facility: CLINIC | Age: 60
End: 2020-07-27

## 2020-07-27 NOTE — TELEPHONE ENCOUNTER
LMOR for pt to call back, think we can cancel the appointment next week as she was already seen for her annual last month and f/u was noted for 1 year.

## 2020-08-12 DIAGNOSIS — Z12.31 ENCOUNTER FOR SCREENING MAMMOGRAM FOR MALIGNANT NEOPLASM OF BREAST: Primary | ICD-10-CM

## 2020-08-13 ENCOUNTER — HOSPITAL ENCOUNTER (OUTPATIENT)
Dept: RADIOLOGY | Facility: HOSPITAL | Age: 60
Discharge: HOME | End: 2020-08-13
Attending: FAMILY MEDICINE
Payer: COMMERCIAL

## 2020-08-13 DIAGNOSIS — Z12.31 ENCOUNTER FOR SCREENING MAMMOGRAM FOR MALIGNANT NEOPLASM OF BREAST: ICD-10-CM

## 2020-08-13 PROCEDURE — 77067 SCR MAMMO BI INCL CAD: CPT | Mod: TC,PO

## 2020-08-17 ENCOUNTER — TELEPHONE (OUTPATIENT)
Dept: FAMILY MEDICINE | Facility: CLINIC | Age: 60
End: 2020-08-17

## 2020-08-17 NOTE — TELEPHONE ENCOUNTER
----- Message from Fan Bosch MD sent at 8/16/2020  4:24 PM CDT -----  Call patient.  Mammogram was normal.  Repeat mammogram in 1 year.

## 2020-09-10 ENCOUNTER — TELEPHONE (OUTPATIENT)
Dept: FAMILY MEDICINE | Facility: CLINIC | Age: 60
End: 2020-09-10

## 2020-09-10 NOTE — TELEPHONE ENCOUNTER
----- Message from Willow De Paz MA sent at 6/17/2020  9:33 AM CDT -----  6/17/20 Set a remind me for 3 months for her to repeat her UA   Per Dr. Bosch

## 2020-09-10 NOTE — TELEPHONE ENCOUNTER
LMOR that lab is due to recheck urinalysis and this is a 3 month f/u from [facility]. Order at [facility]. Updated remind me.

## 2020-09-23 ENCOUNTER — TELEPHONE (OUTPATIENT)
Dept: FAMILY MEDICINE | Facility: CLINIC | Age: 60
End: 2020-09-23

## 2020-09-23 NOTE — TELEPHONE ENCOUNTER
Spoke to patient that repeat u/a is due. States that she is in quarantine right now so that she can see her grandchildren and won't be able to repeat u/a until after October 7th. Updated remind me.

## 2020-10-12 ENCOUNTER — CLINICAL SUPPORT (OUTPATIENT)
Dept: FAMILY MEDICINE | Facility: CLINIC | Age: 60
End: 2020-10-12
Payer: COMMERCIAL

## 2020-10-12 VITALS — TEMPERATURE: 98 F

## 2020-10-12 DIAGNOSIS — Z23 NEED FOR IMMUNIZATION AGAINST INFLUENZA: Primary | ICD-10-CM

## 2020-10-12 PROCEDURE — 90471 IMMUNIZATION ADMIN: CPT | Mod: S$GLB,,, | Performed by: FAMILY MEDICINE

## 2020-10-12 PROCEDURE — 90471 FLU VACCINE - QUADRIVALENT (RECOMBINANT) PRESERVATIVE FREE: ICD-10-PCS | Mod: S$GLB,,, | Performed by: FAMILY MEDICINE

## 2020-10-12 PROCEDURE — 90682 RIV4 VACC RECOMBINANT DNA IM: CPT | Mod: S$GLB,,, | Performed by: FAMILY MEDICINE

## 2020-10-12 PROCEDURE — 90682 FLU VACCINE - QUADRIVALENT (RECOMBINANT) PRESERVATIVE FREE: ICD-10-PCS | Mod: S$GLB,,, | Performed by: FAMILY MEDICINE

## 2020-10-13 LAB
APPEARANCE UR: CLEAR
BILIRUB UR QL STRIP: NEGATIVE
COLOR UR: YELLOW
GLUCOSE UR QL STRIP: NEGATIVE
HGB UR QL STRIP: NEGATIVE
KETONES UR QL STRIP: NEGATIVE
LEUKOCYTE ESTERASE UR QL STRIP: NEGATIVE
NITRITE UR QL STRIP: NEGATIVE
PH UR STRIP: 7.5 [PH] (ref 5–8)
PROT UR QL STRIP: NEGATIVE
SP GR UR STRIP: 1 (ref 1–1.03)

## 2020-10-15 ENCOUNTER — PATIENT MESSAGE (OUTPATIENT)
Dept: FAMILY MEDICINE | Facility: CLINIC | Age: 60
End: 2020-10-15

## 2020-10-19 ENCOUNTER — TELEPHONE (OUTPATIENT)
Dept: FAMILY MEDICINE | Facility: CLINIC | Age: 60
End: 2020-10-19

## 2020-10-19 NOTE — TELEPHONE ENCOUNTER
----- Message from Fan Bosch MD sent at 10/16/2020 12:31 PM CDT -----  Urine test looks completely normal.

## 2021-03-24 ENCOUNTER — TELEPHONE (OUTPATIENT)
Dept: FAMILY MEDICINE | Facility: CLINIC | Age: 61
End: 2021-03-24

## 2021-06-08 ENCOUNTER — PATIENT MESSAGE (OUTPATIENT)
Dept: FAMILY MEDICINE | Facility: CLINIC | Age: 61
End: 2021-06-08

## 2021-06-08 ENCOUNTER — TELEPHONE (OUTPATIENT)
Dept: FAMILY MEDICINE | Facility: CLINIC | Age: 61
End: 2021-06-08

## 2021-06-08 DIAGNOSIS — E78.2 MIXED HYPERLIPIDEMIA: ICD-10-CM

## 2021-06-08 DIAGNOSIS — Z79.899 ENCOUNTER FOR LONG-TERM (CURRENT) USE OF OTHER MEDICATIONS: Primary | ICD-10-CM

## 2021-06-08 DIAGNOSIS — Z00.00 ROUTINE GENERAL MEDICAL EXAMINATION AT A HEALTH CARE FACILITY: ICD-10-CM

## 2021-06-08 DIAGNOSIS — Z79.899 OTHER LONG TERM (CURRENT) DRUG THERAPY: ICD-10-CM

## 2021-06-17 LAB
ALBUMIN SERPL-MCNC: 4.1 G/DL (ref 3.6–5.1)
ALBUMIN/GLOB SERPL: 2 (CALC) (ref 1–2.5)
ALP SERPL-CCNC: 67 U/L (ref 37–153)
ALT SERPL-CCNC: 6 U/L (ref 6–29)
APPEARANCE UR: CLEAR
AST SERPL-CCNC: 16 U/L (ref 10–35)
BACTERIA #/AREA URNS HPF: ABNORMAL /HPF
BACTERIA UR CULT: NORMAL
BASOPHILS # BLD AUTO: 39 CELLS/UL (ref 0–200)
BASOPHILS NFR BLD AUTO: 0.7 %
BILIRUB SERPL-MCNC: 0.5 MG/DL (ref 0.2–1.2)
BILIRUB UR QL STRIP: NEGATIVE
BUN SERPL-MCNC: 11 MG/DL (ref 7–25)
BUN/CREAT SERPL: NORMAL (CALC) (ref 6–22)
CALCIUM SERPL-MCNC: 8.8 MG/DL (ref 8.6–10.4)
CHLORIDE SERPL-SCNC: 106 MMOL/L (ref 98–110)
CHOLEST SERPL-MCNC: 230 MG/DL
CHOLEST/HDLC SERPL: 3.8 (CALC)
CO2 SERPL-SCNC: 30 MMOL/L (ref 20–32)
COLOR UR: YELLOW
CREAT SERPL-MCNC: 0.7 MG/DL (ref 0.5–0.99)
EOSINOPHIL # BLD AUTO: 129 CELLS/UL (ref 15–500)
EOSINOPHIL NFR BLD AUTO: 2.3 %
ERYTHROCYTE [DISTWIDTH] IN BLOOD BY AUTOMATED COUNT: 12.9 % (ref 11–15)
GLOBULIN SER CALC-MCNC: 2.1 G/DL (CALC) (ref 1.9–3.7)
GLUCOSE SERPL-MCNC: 96 MG/DL (ref 65–99)
GLUCOSE UR QL STRIP: NEGATIVE
HCT VFR BLD AUTO: 41.2 % (ref 35–45)
HDLC SERPL-MCNC: 60 MG/DL
HGB BLD-MCNC: 13.7 G/DL (ref 11.7–15.5)
HGB UR QL STRIP: ABNORMAL
HYALINE CASTS #/AREA URNS LPF: ABNORMAL /LPF
KETONES UR QL STRIP: NEGATIVE
LDLC SERPL CALC-MCNC: 151 MG/DL (CALC)
LEUKOCYTE ESTERASE UR QL STRIP: ABNORMAL
LYMPHOCYTES # BLD AUTO: 1445 CELLS/UL (ref 850–3900)
LYMPHOCYTES NFR BLD AUTO: 25.8 %
MCH RBC QN AUTO: 30.7 PG (ref 27–33)
MCHC RBC AUTO-ENTMCNC: 33.3 G/DL (ref 32–36)
MCV RBC AUTO: 92.4 FL (ref 80–100)
MONOCYTES # BLD AUTO: 386 CELLS/UL (ref 200–950)
MONOCYTES NFR BLD AUTO: 6.9 %
NEUTROPHILS # BLD AUTO: 3601 CELLS/UL (ref 1500–7800)
NEUTROPHILS NFR BLD AUTO: 64.3 %
NITRITE UR QL STRIP: NEGATIVE
NONHDLC SERPL-MCNC: 170 MG/DL (CALC)
PH UR STRIP: 8 [PH] (ref 5–8)
PLATELET # BLD AUTO: 285 THOUSAND/UL (ref 140–400)
PMV BLD REES-ECKER: 9.9 FL (ref 7.5–12.5)
POTASSIUM SERPL-SCNC: 4.1 MMOL/L (ref 3.5–5.3)
PROT SERPL-MCNC: 6.2 G/DL (ref 6.1–8.1)
PROT UR QL STRIP: NEGATIVE
RBC # BLD AUTO: 4.46 MILLION/UL (ref 3.8–5.1)
RBC #/AREA URNS HPF: ABNORMAL /HPF
SODIUM SERPL-SCNC: 140 MMOL/L (ref 135–146)
SP GR UR STRIP: 1.01 (ref 1–1.03)
SQUAMOUS #/AREA URNS HPF: ABNORMAL /HPF
TRIGL SERPL-MCNC: 82 MG/DL
TSH SERPL-ACNC: 1.44 MIU/L (ref 0.4–4.5)
WBC # BLD AUTO: 5.6 THOUSAND/UL (ref 3.8–10.8)
WBC #/AREA URNS HPF: ABNORMAL /HPF

## 2021-06-23 ENCOUNTER — TELEPHONE (OUTPATIENT)
Dept: FAMILY MEDICINE | Facility: CLINIC | Age: 61
End: 2021-06-23

## 2021-06-23 ENCOUNTER — OFFICE VISIT (OUTPATIENT)
Dept: FAMILY MEDICINE | Facility: CLINIC | Age: 61
End: 2021-06-23
Payer: COMMERCIAL

## 2021-06-23 VITALS
HEART RATE: 78 BPM | HEIGHT: 66 IN | BODY MASS INDEX: 22.02 KG/M2 | DIASTOLIC BLOOD PRESSURE: 80 MMHG | WEIGHT: 137 LBS | SYSTOLIC BLOOD PRESSURE: 122 MMHG

## 2021-06-23 DIAGNOSIS — Z00.00 WELLNESS EXAMINATION: Primary | ICD-10-CM

## 2021-06-23 DIAGNOSIS — F41.1 GENERALIZED ANXIETY DISORDER: ICD-10-CM

## 2021-06-23 DIAGNOSIS — F51.01 PRIMARY INSOMNIA: ICD-10-CM

## 2021-06-23 DIAGNOSIS — Z12.31 OTHER SCREENING MAMMOGRAM: ICD-10-CM

## 2021-06-23 DIAGNOSIS — R31.9 HEMATURIA OF UNDIAGNOSED CAUSE: ICD-10-CM

## 2021-06-23 DIAGNOSIS — E78.2 MIXED HYPERLIPIDEMIA: ICD-10-CM

## 2021-06-23 DIAGNOSIS — N32.81 OAB (OVERACTIVE BLADDER): ICD-10-CM

## 2021-06-23 DIAGNOSIS — N32.81 OVERACTIVE BLADDER: ICD-10-CM

## 2021-06-23 PROCEDURE — 99396 PREV VISIT EST AGE 40-64: CPT | Mod: S$GLB,,, | Performed by: FAMILY MEDICINE

## 2021-06-23 PROCEDURE — 99396 PR PREVENTIVE VISIT,EST,40-64: ICD-10-PCS | Mod: S$GLB,,, | Performed by: FAMILY MEDICINE

## 2021-06-23 RX ORDER — BUSPIRONE HYDROCHLORIDE 10 MG/1
10 TABLET ORAL 3 TIMES DAILY PRN
Qty: 30 TABLET | Refills: 3 | Status: SHIPPED | OUTPATIENT
Start: 2021-06-23 | End: 2022-06-23 | Stop reason: SDUPTHER

## 2021-06-23 RX ORDER — OXYBUTYNIN CHLORIDE 5 MG/1
5 TABLET ORAL 3 TIMES DAILY
Qty: 30 TABLET | Refills: 3 | Status: SHIPPED | OUTPATIENT
Start: 2021-06-23 | End: 2022-06-23 | Stop reason: SDUPTHER

## 2021-06-23 RX ORDER — OXYBUTYNIN CHLORIDE 5 MG/1
5 TABLET, EXTENDED RELEASE ORAL DAILY
Qty: 30 TABLET | Refills: 5 | Status: SHIPPED | OUTPATIENT
Start: 2021-06-23 | End: 2022-06-23

## 2021-09-14 ENCOUNTER — TELEPHONE (OUTPATIENT)
Dept: FAMILY MEDICINE | Facility: CLINIC | Age: 61
End: 2021-09-14

## 2021-09-17 ENCOUNTER — HOSPITAL ENCOUNTER (OUTPATIENT)
Dept: RADIOLOGY | Facility: HOSPITAL | Age: 61
Discharge: HOME | End: 2021-09-17
Attending: FAMILY MEDICINE
Payer: COMMERCIAL

## 2021-09-17 DIAGNOSIS — Z12.31 OTHER SCREENING MAMMOGRAM: ICD-10-CM

## 2021-09-17 PROCEDURE — 77067 SCR MAMMO BI INCL CAD: CPT | Mod: TC,PO

## 2021-09-20 ENCOUNTER — TELEPHONE (OUTPATIENT)
Dept: FAMILY MEDICINE | Facility: CLINIC | Age: 61
End: 2021-09-20

## 2021-11-01 ENCOUNTER — CLINICAL SUPPORT (OUTPATIENT)
Dept: FAMILY MEDICINE | Facility: CLINIC | Age: 61
End: 2021-11-01
Payer: COMMERCIAL

## 2021-11-01 VITALS — TEMPERATURE: 98 F

## 2021-11-01 DIAGNOSIS — Z79.899 ENCOUNTER FOR LONG-TERM (CURRENT) USE OF OTHER MEDICATIONS: Primary | ICD-10-CM

## 2021-11-01 PROCEDURE — 90471 IMMUNIZATION ADMIN: CPT | Mod: S$GLB,,, | Performed by: NURSE PRACTITIONER

## 2021-11-01 PROCEDURE — 90682 RIV4 VACC RECOMBINANT DNA IM: CPT | Mod: S$GLB,,, | Performed by: NURSE PRACTITIONER

## 2021-11-01 PROCEDURE — 90471 FLU VACCINE - QUADRIVALENT (RECOMBINANT) PRESERVATIVE FREE: ICD-10-PCS | Mod: S$GLB,,, | Performed by: NURSE PRACTITIONER

## 2021-11-01 PROCEDURE — 90682 FLU VACCINE - QUADRIVALENT (RECOMBINANT) PRESERVATIVE FREE: ICD-10-PCS | Mod: S$GLB,,, | Performed by: NURSE PRACTITIONER

## 2022-06-15 ENCOUNTER — TELEPHONE (OUTPATIENT)
Dept: FAMILY MEDICINE | Facility: CLINIC | Age: 62
End: 2022-06-15

## 2022-06-15 DIAGNOSIS — Z79.899 OTHER LONG TERM (CURRENT) DRUG THERAPY: Primary | ICD-10-CM

## 2022-06-15 DIAGNOSIS — E78.2 MIXED HYPERLIPIDEMIA: ICD-10-CM

## 2022-06-15 DIAGNOSIS — Z79.899 ENCOUNTER FOR LONG-TERM (CURRENT) USE OF OTHER MEDICATIONS: ICD-10-CM

## 2022-06-15 DIAGNOSIS — Z00.00 WELLNESS EXAMINATION: ICD-10-CM

## 2022-06-15 NOTE — TELEPHONE ENCOUNTER
----- Message from Joanne Mcnair sent at 6/15/2022  9:09 AM CDT -----  Pt calling has an appt on the 23rd wants her labs ordered to [facility] [phone]

## 2022-06-18 LAB
ALBUMIN SERPL-MCNC: 4.4 G/DL (ref 3.6–5.1)
ALBUMIN/GLOB SERPL: 2.2 (CALC) (ref 1–2.5)
ALP SERPL-CCNC: 80 U/L (ref 37–153)
ALT SERPL-CCNC: 6 U/L (ref 6–29)
APPEARANCE UR: CLEAR
AST SERPL-CCNC: 15 U/L (ref 10–35)
BACTERIA #/AREA URNS HPF: NORMAL /HPF
BACTERIA UR CULT: NORMAL
BASOPHILS # BLD AUTO: 42 CELLS/UL (ref 0–200)
BASOPHILS NFR BLD AUTO: 0.8 %
BILIRUB SERPL-MCNC: 0.8 MG/DL (ref 0.2–1.2)
BILIRUB UR QL STRIP: NEGATIVE
BUN SERPL-MCNC: 13 MG/DL (ref 7–25)
BUN/CREAT SERPL: NORMAL (CALC) (ref 6–22)
CALCIUM SERPL-MCNC: 9.2 MG/DL (ref 8.6–10.4)
CHLORIDE SERPL-SCNC: 103 MMOL/L (ref 98–110)
CHOLEST SERPL-MCNC: 215 MG/DL
CHOLEST/HDLC SERPL: 3.4 (CALC)
CO2 SERPL-SCNC: 29 MMOL/L (ref 20–32)
COLOR UR: YELLOW
CREAT SERPL-MCNC: 0.77 MG/DL (ref 0.5–0.99)
EOSINOPHIL # BLD AUTO: 133 CELLS/UL (ref 15–500)
EOSINOPHIL NFR BLD AUTO: 2.5 %
ERYTHROCYTE [DISTWIDTH] IN BLOOD BY AUTOMATED COUNT: 12.6 % (ref 11–15)
GLOBULIN SER CALC-MCNC: 2 G/DL (CALC) (ref 1.9–3.7)
GLUCOSE SERPL-MCNC: 96 MG/DL (ref 65–99)
GLUCOSE UR QL STRIP: NEGATIVE
HCT VFR BLD AUTO: 39.8 % (ref 35–45)
HDLC SERPL-MCNC: 64 MG/DL
HGB BLD-MCNC: 13 G/DL (ref 11.7–15.5)
HGB UR QL STRIP: NEGATIVE
HYALINE CASTS #/AREA URNS LPF: NORMAL /LPF
KETONES UR QL STRIP: NEGATIVE
LDLC SERPL CALC-MCNC: 135 MG/DL (CALC)
LEUKOCYTE ESTERASE UR QL STRIP: NEGATIVE
LYMPHOCYTES # BLD AUTO: 1346 CELLS/UL (ref 850–3900)
LYMPHOCYTES NFR BLD AUTO: 25.4 %
MCH RBC QN AUTO: 29.7 PG (ref 27–33)
MCHC RBC AUTO-ENTMCNC: 32.7 G/DL (ref 32–36)
MCV RBC AUTO: 90.9 FL (ref 80–100)
MONOCYTES # BLD AUTO: 413 CELLS/UL (ref 200–950)
MONOCYTES NFR BLD AUTO: 7.8 %
NEUTROPHILS # BLD AUTO: 3366 CELLS/UL (ref 1500–7800)
NEUTROPHILS NFR BLD AUTO: 63.5 %
NITRITE UR QL STRIP: NEGATIVE
NONHDLC SERPL-MCNC: 151 MG/DL (CALC)
PH UR STRIP: 8 [PH] (ref 5–8)
PLATELET # BLD AUTO: 270 THOUSAND/UL (ref 140–400)
PMV BLD REES-ECKER: 9.9 FL (ref 7.5–12.5)
POTASSIUM SERPL-SCNC: 4.4 MMOL/L (ref 3.5–5.3)
PROT SERPL-MCNC: 6.4 G/DL (ref 6.1–8.1)
PROT UR QL STRIP: NEGATIVE
RBC # BLD AUTO: 4.38 MILLION/UL (ref 3.8–5.1)
RBC #/AREA URNS HPF: NORMAL /HPF
SODIUM SERPL-SCNC: 140 MMOL/L (ref 135–146)
SP GR UR STRIP: 1 (ref 1–1.03)
SQUAMOUS #/AREA URNS HPF: NORMAL /HPF
TRIGL SERPL-MCNC: 68 MG/DL
TSH SERPL-ACNC: 1.81 MIU/L (ref 0.4–4.5)
WBC # BLD AUTO: 5.3 THOUSAND/UL (ref 3.8–10.8)
WBC #/AREA URNS HPF: NORMAL /HPF

## 2022-06-23 ENCOUNTER — TELEPHONE (OUTPATIENT)
Dept: FAMILY MEDICINE | Facility: CLINIC | Age: 62
End: 2022-06-23

## 2022-06-23 ENCOUNTER — OFFICE VISIT (OUTPATIENT)
Dept: FAMILY MEDICINE | Facility: CLINIC | Age: 62
End: 2022-06-23
Payer: COMMERCIAL

## 2022-06-23 VITALS
HEIGHT: 66 IN | BODY MASS INDEX: 21.86 KG/M2 | DIASTOLIC BLOOD PRESSURE: 80 MMHG | SYSTOLIC BLOOD PRESSURE: 128 MMHG | HEART RATE: 74 BPM | WEIGHT: 136 LBS

## 2022-06-23 DIAGNOSIS — N32.81 OVERACTIVE BLADDER: ICD-10-CM

## 2022-06-23 DIAGNOSIS — Z12.31 OTHER SCREENING MAMMOGRAM: ICD-10-CM

## 2022-06-23 DIAGNOSIS — E78.2 MIXED HYPERLIPIDEMIA: ICD-10-CM

## 2022-06-23 DIAGNOSIS — K21.9 GASTROESOPHAGEAL REFLUX DISEASE WITHOUT ESOPHAGITIS: ICD-10-CM

## 2022-06-23 DIAGNOSIS — N32.81 OAB (OVERACTIVE BLADDER): ICD-10-CM

## 2022-06-23 DIAGNOSIS — M81.0 AGE-RELATED OSTEOPOROSIS WITHOUT CURRENT PATHOLOGICAL FRACTURE: ICD-10-CM

## 2022-06-23 DIAGNOSIS — Z00.00 WELLNESS EXAMINATION: Primary | ICD-10-CM

## 2022-06-23 DIAGNOSIS — F41.1 GENERALIZED ANXIETY DISORDER: ICD-10-CM

## 2022-06-23 DIAGNOSIS — F51.01 PRIMARY INSOMNIA: ICD-10-CM

## 2022-06-23 PROCEDURE — 99396 PR PREVENTIVE VISIT,EST,40-64: ICD-10-PCS | Mod: S$GLB,,, | Performed by: FAMILY MEDICINE

## 2022-06-23 PROCEDURE — 99396 PREV VISIT EST AGE 40-64: CPT | Mod: S$GLB,,, | Performed by: FAMILY MEDICINE

## 2022-06-23 PROCEDURE — 1159F MED LIST DOCD IN RCRD: CPT | Mod: CPTII,S$GLB,, | Performed by: FAMILY MEDICINE

## 2022-06-23 PROCEDURE — 3008F BODY MASS INDEX DOCD: CPT | Mod: CPTII,S$GLB,, | Performed by: FAMILY MEDICINE

## 2022-06-23 PROCEDURE — 3074F SYST BP LT 130 MM HG: CPT | Mod: CPTII,S$GLB,, | Performed by: FAMILY MEDICINE

## 2022-06-23 PROCEDURE — 3079F PR MOST RECENT DIASTOLIC BLOOD PRESSURE 80-89 MM HG: ICD-10-PCS | Mod: CPTII,S$GLB,, | Performed by: FAMILY MEDICINE

## 2022-06-23 PROCEDURE — 1159F PR MEDICATION LIST DOCUMENTED IN MEDICAL RECORD: ICD-10-PCS | Mod: CPTII,S$GLB,, | Performed by: FAMILY MEDICINE

## 2022-06-23 PROCEDURE — 3008F PR BODY MASS INDEX (BMI) DOCUMENTED: ICD-10-PCS | Mod: CPTII,S$GLB,, | Performed by: FAMILY MEDICINE

## 2022-06-23 PROCEDURE — 3079F DIAST BP 80-89 MM HG: CPT | Mod: CPTII,S$GLB,, | Performed by: FAMILY MEDICINE

## 2022-06-23 PROCEDURE — 3074F PR MOST RECENT SYSTOLIC BLOOD PRESSURE < 130 MM HG: ICD-10-PCS | Mod: CPTII,S$GLB,, | Performed by: FAMILY MEDICINE

## 2022-06-23 RX ORDER — BUSPIRONE HYDROCHLORIDE 10 MG/1
10 TABLET ORAL 3 TIMES DAILY PRN
Qty: 30 TABLET | Refills: 3 | Status: SHIPPED | OUTPATIENT
Start: 2022-06-23 | End: 2023-06-27 | Stop reason: SDUPTHER

## 2022-06-23 RX ORDER — OXYBUTYNIN CHLORIDE 10 MG/1
10 TABLET, EXTENDED RELEASE ORAL DAILY
Qty: 30 TABLET | Refills: 3 | Status: SHIPPED | OUTPATIENT
Start: 2022-06-23 | End: 2023-06-23

## 2022-06-23 RX ORDER — OXYBUTYNIN CHLORIDE 5 MG/1
TABLET ORAL
Qty: 60 TABLET | Refills: 3 | Status: SHIPPED | OUTPATIENT
Start: 2022-06-23 | End: 2023-11-14 | Stop reason: SDUPTHER

## 2022-06-23 NOTE — PROGRESS NOTES
SUBJECTIVE:    Patient ID: Ani Matthews is a 62 y.o. female.    Chief Complaint: Follow-up and Medication Refill (Went over meds verbally // Mammogram ordered // discuss about Lab-results and Medications //abc)    62-year-old female takes a morning walk 20-30 minutes 7 days a week.  She feels good and has no chest pain or shortness of breath with exercise.  She drinks 2-3 cups of coffee per day.    Overactive bladder with frequency.  Will take Oxybutynin only on a p.r.n. basis    2019-colonoscopy with Dr. Clark[facility] 5 years    Mammogram due this year    BusPar 10 mg used only p.r.n. anxiety.  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    Nonsmoker rarely drinks any alcohol.      Telephone on 06/15/2022   Component Date Value Ref Range Status    WBC 06/17/2022 5.3  3.8 - 10.8 Thousand/uL Final    RBC 06/17/2022 4.38  3.80 - 5.10 Million/uL Final    Hemoglobin 06/17/2022 13.0  11.7 - 15.5 g/dL Final    Hematocrit 06/17/2022 39.8  35.0 - 45.0 % Final    MCV 06/17/2022 90.9  80.0 - 100.0 fL Final    MCH 06/17/2022 29.7  27.0 - 33.0 pg Final    MCHC 06/17/2022 32.7  32.0 - 36.0 g/dL Final    RDW 06/17/2022 12.6  11.0 - 15.0 % Final    Platelets 06/17/2022 270  140 - 400 Thousand/uL Final    MPV 06/17/2022 9.9  7.5 - 12.5 fL Final    Neutrophils, Abs 06/17/2022 3,366  1,500 - 7,800 cells/uL Final    Lymph # 06/17/2022 1,346  850 - 3,900 cells/uL Final    Mono # 06/17/2022 413  200 - 950 cells/uL Final    Eos # 06/17/2022 133  15 - 500 cells/uL Final    Baso # 06/17/2022 42  0 - 200 cells/uL Final    Neutrophils Relative 06/17/2022 63.5  % Final    Lymph % 06/17/2022 25.4  % Final    Mono % 06/17/2022 7.8  % Final    Eosinophil % 06/17/2022 2.5  % Final    Basophil % 06/17/2022 0.8  % Final    Glucose 06/17/2022 96  65 - 99 mg/dL Final    BUN 06/17/2022 13  7 - 25 mg/dL Final    Creatinine 06/17/2022 0.77  0.50 - 0.99 mg/dL Final    eGFR if non African American 06/17/2022 82   > OR = 60 mL/min/1.73m2 Final    eGFR if  06/17/2022 95  > OR = 60 mL/min/1.73m2 Final    BUN/Creatinine Ratio 06/17/2022 NOT APPLICABLE  6 - 22 (calc) Final    Sodium 06/17/2022 140  135 - 146 mmol/L Final    Potassium 06/17/2022 4.4  3.5 - 5.3 mmol/L Final    Chloride 06/17/2022 103  98 - 110 mmol/L Final    CO2 06/17/2022 29  20 - 32 mmol/L Final    Calcium 06/17/2022 9.2  8.6 - 10.4 mg/dL Final    Total Protein 06/17/2022 6.4  6.1 - 8.1 g/dL Final    Albumin 06/17/2022 4.4  3.6 - 5.1 g/dL Final    Globulin, Total 06/17/2022 2.0  1.9 - 3.7 g/dL (calc) Final    Albumin/Globulin Ratio 06/17/2022 2.2  1.0 - 2.5 (calc) Final    Total Bilirubin 06/17/2022 0.8  0.2 - 1.2 mg/dL Final    Alkaline Phosphatase 06/17/2022 80  37 - 153 U/L Final    AST 06/17/2022 15  10 - 35 U/L Final    ALT 06/17/2022 6  6 - 29 U/L Final    Cholesterol 06/17/2022 215 (A) <200 mg/dL Final    HDL 06/17/2022 64  > OR = 50 mg/dL Final    Triglycerides 06/17/2022 68  <150 mg/dL Final    LDL Cholesterol 06/17/2022 135 (A) mg/dL (calc) Final    HDL/Cholesterol Ratio 06/17/2022 3.4  <5.0 (calc) Final    Non HDL Chol. (LDL+VLDL) 06/17/2022 151 (A) <130 mg/dL (calc) Final    TSH 06/17/2022 1.81  0.40 - 4.50 mIU/L Final    Color, UA 06/17/2022 YELLOW  YELLOW Final    Appearance, UA 06/17/2022 CLEAR  CLEAR Final    Specific Gravity, UA 06/17/2022 1.004  1.001 - 1.035 Final    pH, UA 06/17/2022 8.0  5.0 - 8.0 Final    Glucose, UA 06/17/2022 NEGATIVE  NEGATIVE Final    Bilirubin, UA 06/17/2022 NEGATIVE  NEGATIVE Final    Ketones, UA 06/17/2022 NEGATIVE  NEGATIVE Final    Occult Blood UA 06/17/2022 NEGATIVE  NEGATIVE Final    Protein, UA 06/17/2022 NEGATIVE  NEGATIVE Final    Nitrite, UA 06/17/2022 NEGATIVE  NEGATIVE Final    Leukocytes, UA 06/17/2022 NEGATIVE  NEGATIVE Final    WBC Casts, UA 06/17/2022 NONE SEEN  < OR = 5 /HPF Final    RBC Casts, UA 06/17/2022 NONE SEEN  < OR = 2 /HPF Final    Squam  Epithel, UA 06/17/2022 NONE SEEN  < OR = 5 /HPF Final    Bacteria, UA 06/17/2022 NONE SEEN  NONE SEEN /HPF Final    Hyaline Casts, UA 06/17/2022 NONE SEEN  NONE SEEN /LPF Final    Reflexive Urine Culture 06/17/2022    Final       Past Medical History:   Diagnosis Date    Osteoporosis, unspecified     Overactive bladder      Social History     Socioeconomic History    Marital status:    Tobacco Use    Smoking status: Never Smoker    Smokeless tobacco: Never Used     Past Surgical History:   Procedure Laterality Date    COLONOSCOPY  2018    dr geronimo- rtc 5 yr     Family History   Problem Relation Age of Onset    Breast cancer Paternal Aunt        Review of patient's allergies indicates:  No Known Allergies    Current Outpatient Medications:     calcium citrate-vitamin D3 315-200 mg (CITRACAL+D) 315 mg-5 mcg (200 unit) per tablet, 1 tablet with meals, Disp: , Rfl:     doxycycline (VIBRAMYCIN) 100 MG Cap, 1 po qd with food x 2-4 wks prn rosacea flare, Disp: 30 capsule, Rfl: 3    metronidazole 0.75% (METROCREAM) 0.75 % Crea, Apply topically 2 (two) times daily. For rosacea, Disp: 45 g, Rfl: 6    busPIRone (BUSPAR) 10 MG tablet, Take 1 tablet (10 mg total) by mouth 3 (three) times daily as needed., Disp: 30 tablet, Rfl: 3    oxybutynin (DITROPAN) 5 MG Tab, Take 2 tablets as needed for bladder, Disp: 60 tablet, Rfl: 3    oxybutynin (DITROPAN-XL) 10 MG 24 hr tablet, Take 1 tablet (10 mg total) by mouth once daily., Disp: 30 tablet, Rfl: 3    Review of Systems   Constitutional: Negative for appetite change, chills, fatigue, fever and unexpected weight change.   HENT: Negative for congestion, ear pain, sinus pain, sore throat and trouble swallowing.    Eyes: Negative for pain, discharge and visual disturbance.   Respiratory: Negative for apnea, cough, shortness of breath and wheezing.    Cardiovascular: Negative for chest pain, palpitations and leg swelling.   Gastrointestinal: Negative for abdominal  "pain, blood in stool, constipation, diarrhea, nausea and vomiting.   Endocrine: Negative for heat intolerance, polydipsia and polyuria.   Genitourinary: Positive for frequency. Negative for difficulty urinating, dyspareunia, dysuria, hematuria and menstrual problem.        Nocturia 0-1 x  nite   Musculoskeletal: Negative for arthralgias, back pain, gait problem, joint swelling and myalgias.   Allergic/Immunologic: Negative for environmental allergies, food allergies and immunocompromised state.   Neurological: Negative for dizziness, tremors, seizures, numbness and headaches.   Psychiatric/Behavioral: Positive for sleep disturbance (wakes up in middle of  night and  cant  return to sleep). Negative for behavioral problems, confusion, hallucinations and suicidal ideas. The patient is not nervous/anxious.           Objective:      Vitals:    06/23/22 0830   BP: 128/80   Pulse: 74   Weight: 61.7 kg (136 lb)   Height: 5' 6" (1.676 m)     Physical Exam  Vitals and nursing note reviewed.   Constitutional:       General: She is not in acute distress.     Appearance: Normal appearance. She is well-developed and normal weight. She is not toxic-appearing.   HENT:      Head: Normocephalic and atraumatic.      Right Ear: Tympanic membrane and external ear normal.      Left Ear: Tympanic membrane and external ear normal.      Nose: Nose normal.      Mouth/Throat:      Pharynx: Oropharynx is clear.   Eyes:      Pupils: Pupils are equal, round, and reactive to light.   Neck:      Thyroid: No thyromegaly.      Vascular: No carotid bruit.   Cardiovascular:      Rate and Rhythm: Normal rate and regular rhythm.      Heart sounds: Normal heart sounds. No murmur heard.  Pulmonary:      Effort: Pulmonary effort is normal.      Breath sounds: Normal breath sounds. No wheezing or rales.   Abdominal:      General: Bowel sounds are normal. There is no distension.      Palpations: Abdomen is soft.      Tenderness: There is no abdominal " tenderness.   Musculoskeletal:         General: No tenderness or deformity. Normal range of motion.      Cervical back: Normal range of motion and neck supple.      Lumbar back: Normal. No spasms.      Comments: Bends 90 degrees at  waist shoulders and knees good range of motion, no pitting edema to lower extremities.   Lymphadenopathy:      Cervical: No cervical adenopathy.   Skin:     General: Skin is warm and dry.      Findings: No rash.   Neurological:      Mental Status: She is alert and oriented to person, place, and time. Mental status is at baseline.      Cranial Nerves: No cranial nerve deficit.      Coordination: Coordination normal.   Psychiatric:         Behavior: Behavior normal.         Thought Content: Thought content normal.         Judgment: Judgment normal.      Comments: Mild anxiety present           Assessment:       1. Wellness examination    2. Generalized anxiety disorder    3. Other screening mammogram    4. OAB (overactive bladder)    5. Mixed hyperlipidemia    6. Overactive bladder    7. Gastroesophageal reflux disease without esophagitis    8. Age-related osteoporosis without current pathological fracture    9. Primary insomnia         Plan:       Wellness examination  Patient recommended to get a COVID booster and Shingrix vaccine this year.  Generalized anxiety disorder  -     busPIRone (BUSPAR) 10 MG tablet; Take 1 tablet (10 mg total) by mouth 3 (three) times daily as needed.  Dispense: 30 tablet; Refill: 3  Continue p.r.n. BuSpar  Other screening mammogram  -     Mammo Digital Screening Bilat; Future; Expected date: 06/23/2022  Mammogram ordered  OAB (overactive bladder)  -     oxybutynin (DITROPAN-XL) 10 MG 24 hr tablet; Take 1 tablet (10 mg total) by mouth once daily.  Dispense: 30 tablet; Refill: 3  -     oxybutynin (DITROPAN) 5 MG Tab; Take 2 tablets as needed for bladder  Dispense: 60 tablet; Refill: 3  She only takes the oxybutynin p.r.n.  Mixed hyperlipidemia  Cholesterol 215  HDL 64 TG 68  minimal elevation  Overactive bladder    Gastroesophageal reflux disease without esophagitis    Age-related osteoporosis without current pathological fracture    Primary insomnia  Takes melatonin p.r.n.    Follow up in about 1 year (around 6/23/2023) for Annual Physical.        6/23/2022 Fan Bosch

## 2022-06-23 NOTE — TELEPHONE ENCOUNTER
----- Message from Joanne Mcnair sent at 6/23/2022  9:50 AM CDT -----  [facility] calling to clarify the dosing directions on oxybutynin 5 mg per [place] told him 2 tablets daily as needed.

## 2022-09-08 ENCOUNTER — TELEPHONE (OUTPATIENT)
Dept: FAMILY MEDICINE | Facility: CLINIC | Age: 62
End: 2022-09-08

## 2022-09-08 ENCOUNTER — HOSPITAL ENCOUNTER (OUTPATIENT)
Dept: RADIOLOGY | Facility: HOSPITAL | Age: 62
Discharge: HOME | End: 2022-09-08
Attending: PHYSICIAN ASSISTANT
Payer: COMMERCIAL

## 2022-09-08 ENCOUNTER — OFFICE VISIT (OUTPATIENT)
Dept: FAMILY MEDICINE | Facility: CLINIC | Age: 62
End: 2022-09-08
Payer: COMMERCIAL

## 2022-09-08 VITALS
BODY MASS INDEX: 22.82 KG/M2 | HEIGHT: 66 IN | HEART RATE: 70 BPM | WEIGHT: 142 LBS | OXYGEN SATURATION: 98 % | SYSTOLIC BLOOD PRESSURE: 128 MMHG | DIASTOLIC BLOOD PRESSURE: 82 MMHG

## 2022-09-08 DIAGNOSIS — S90.852A FOREIGN BODY IN LEFT FOOT, INITIAL ENCOUNTER: ICD-10-CM

## 2022-09-08 DIAGNOSIS — S91.332A PUNCTURE WOUND OF LEFT FOOT, INITIAL ENCOUNTER: ICD-10-CM

## 2022-09-08 DIAGNOSIS — S90.852A FOREIGN BODY IN LEFT FOOT, INITIAL ENCOUNTER: Primary | ICD-10-CM

## 2022-09-08 PROCEDURE — 3008F PR BODY MASS INDEX (BMI) DOCUMENTED: ICD-10-PCS | Mod: CPTII,S$GLB,, | Performed by: PHYSICIAN ASSISTANT

## 2022-09-08 PROCEDURE — 90714 TD VACC NO PRESV 7 YRS+ IM: CPT | Mod: S$GLB,,, | Performed by: PHYSICIAN ASSISTANT

## 2022-09-08 PROCEDURE — 99213 OFFICE O/P EST LOW 20 MIN: CPT | Mod: 25,S$GLB,, | Performed by: PHYSICIAN ASSISTANT

## 2022-09-08 PROCEDURE — 99213 PR OFFICE/OUTPT VISIT, EST, LEVL III, 20-29 MIN: ICD-10-PCS | Mod: 25,S$GLB,, | Performed by: PHYSICIAN ASSISTANT

## 2022-09-08 PROCEDURE — 3074F PR MOST RECENT SYSTOLIC BLOOD PRESSURE < 130 MM HG: ICD-10-PCS | Mod: CPTII,S$GLB,, | Performed by: PHYSICIAN ASSISTANT

## 2022-09-08 PROCEDURE — 3079F DIAST BP 80-89 MM HG: CPT | Mod: CPTII,S$GLB,, | Performed by: PHYSICIAN ASSISTANT

## 2022-09-08 PROCEDURE — 1159F MED LIST DOCD IN RCRD: CPT | Mod: CPTII,S$GLB,, | Performed by: PHYSICIAN ASSISTANT

## 2022-09-08 PROCEDURE — 3074F SYST BP LT 130 MM HG: CPT | Mod: CPTII,S$GLB,, | Performed by: PHYSICIAN ASSISTANT

## 2022-09-08 PROCEDURE — 90471 TD VACCINE GREATER THAN OR EQUAL TO 7YO PRESERVATIVE FREE IM: ICD-10-PCS | Mod: S$GLB,,, | Performed by: PHYSICIAN ASSISTANT

## 2022-09-08 PROCEDURE — 3079F PR MOST RECENT DIASTOLIC BLOOD PRESSURE 80-89 MM HG: ICD-10-PCS | Mod: CPTII,S$GLB,, | Performed by: PHYSICIAN ASSISTANT

## 2022-09-08 PROCEDURE — 3008F BODY MASS INDEX DOCD: CPT | Mod: CPTII,S$GLB,, | Performed by: PHYSICIAN ASSISTANT

## 2022-09-08 PROCEDURE — 90471 IMMUNIZATION ADMIN: CPT | Mod: S$GLB,,, | Performed by: PHYSICIAN ASSISTANT

## 2022-09-08 PROCEDURE — 1159F PR MEDICATION LIST DOCUMENTED IN MEDICAL RECORD: ICD-10-PCS | Mod: CPTII,S$GLB,, | Performed by: PHYSICIAN ASSISTANT

## 2022-09-08 PROCEDURE — 73630 X-RAY EXAM OF FOOT: CPT | Mod: TC,PO,LT

## 2022-09-08 PROCEDURE — 90714 TD VACCINE GREATER THAN OR EQUAL TO 7YO PRESERVATIVE FREE IM: ICD-10-PCS | Mod: S$GLB,,, | Performed by: PHYSICIAN ASSISTANT

## 2022-09-08 RX ORDER — SULFAMETHOXAZOLE AND TRIMETHOPRIM 800; 160 MG/1; MG/1
1 TABLET ORAL 2 TIMES DAILY
Qty: 20 TABLET | Refills: 0 | Status: SHIPPED | OUTPATIENT
Start: 2022-09-08 | End: 2022-09-18

## 2022-09-08 NOTE — TELEPHONE ENCOUNTER
----- Message from Nohelia Ang sent at 9/8/2022  8:43 AM CDT -----  Pt had a piece of glass in her foot that she thought she got out but it's still bothering her. Pt needs to be seen today after 3:00 or tomorrow. Pt #[phone]

## 2022-09-08 NOTE — PROGRESS NOTES
SUBJECTIVE:    Patient ID: Ani Matthews is a 62 y.o. female.    Chief Complaint: Foot Pain (Glass in left foot since 8-9 days ago//no med bottles//tc)    This is a 62-year-old female who presents today for evaluation of possible class retained in left foot 8-9 days ago.  Reports feeling a puncture and then seeing a clear glass shard in her left plantar aspect of foot.  She immediately finished it out with a needle.  Thought that she got the whole thing but pain has persisted.  She sees redness, slight warmth and very tender to palpation when walking.  No fever or chills to note.      Telephone on 06/15/2022   Component Date Value Ref Range Status    WBC 06/17/2022 5.3  3.8 - 10.8 Thousand/uL Final    RBC 06/17/2022 4.38  3.80 - 5.10 Million/uL Final    Hemoglobin 06/17/2022 13.0  11.7 - 15.5 g/dL Final    Hematocrit 06/17/2022 39.8  35.0 - 45.0 % Final    MCV 06/17/2022 90.9  80.0 - 100.0 fL Final    MCH 06/17/2022 29.7  27.0 - 33.0 pg Final    MCHC 06/17/2022 32.7  32.0 - 36.0 g/dL Final    RDW 06/17/2022 12.6  11.0 - 15.0 % Final    Platelets 06/17/2022 270  140 - 400 Thousand/uL Final    MPV 06/17/2022 9.9  7.5 - 12.5 fL Final    Neutrophils, Abs 06/17/2022 3,366  1,500 - 7,800 cells/uL Final    Lymph # 06/17/2022 1,346  850 - 3,900 cells/uL Final    Mono # 06/17/2022 413  200 - 950 cells/uL Final    Eos # 06/17/2022 133  15 - 500 cells/uL Final    Baso # 06/17/2022 42  0 - 200 cells/uL Final    Neutrophils Relative 06/17/2022 63.5  % Final    Lymph % 06/17/2022 25.4  % Final    Mono % 06/17/2022 7.8  % Final    Eosinophil % 06/17/2022 2.5  % Final    Basophil % 06/17/2022 0.8  % Final    Glucose 06/17/2022 96  65 - 99 mg/dL Final    BUN 06/17/2022 13  7 - 25 mg/dL Final    Creatinine 06/17/2022 0.77  0.50 - 0.99 mg/dL Final    eGFR if non African American 06/17/2022 82  > OR = 60 mL/min/1.73m2 Final    eGFR if African American 06/17/2022 95  > OR = 60 mL/min/1.73m2 Final    BUN/Creatinine Ratio 06/17/2022  NOT APPLICABLE  6 - 22 (calc) Final    Sodium 06/17/2022 140  135 - 146 mmol/L Final    Potassium 06/17/2022 4.4  3.5 - 5.3 mmol/L Final    Chloride 06/17/2022 103  98 - 110 mmol/L Final    CO2 06/17/2022 29  20 - 32 mmol/L Final    Calcium 06/17/2022 9.2  8.6 - 10.4 mg/dL Final    Total Protein 06/17/2022 6.4  6.1 - 8.1 g/dL Final    Albumin 06/17/2022 4.4  3.6 - 5.1 g/dL Final    Globulin, Total 06/17/2022 2.0  1.9 - 3.7 g/dL (calc) Final    Albumin/Globulin Ratio 06/17/2022 2.2  1.0 - 2.5 (calc) Final    Total Bilirubin 06/17/2022 0.8  0.2 - 1.2 mg/dL Final    Alkaline Phosphatase 06/17/2022 80  37 - 153 U/L Final    AST 06/17/2022 15  10 - 35 U/L Final    ALT 06/17/2022 6  6 - 29 U/L Final    Cholesterol 06/17/2022 215 (H)  <200 mg/dL Final    HDL 06/17/2022 64  > OR = 50 mg/dL Final    Triglycerides 06/17/2022 68  <150 mg/dL Final    LDL Cholesterol 06/17/2022 135 (H)  mg/dL (calc) Final    HDL/Cholesterol Ratio 06/17/2022 3.4  <5.0 (calc) Final    Non HDL Chol. (LDL+VLDL) 06/17/2022 151 (H)  <130 mg/dL (calc) Final    TSH 06/17/2022 1.81  0.40 - 4.50 mIU/L Final    Color, UA 06/17/2022 YELLOW  YELLOW Final    Appearance, UA 06/17/2022 CLEAR  CLEAR Final    Specific Gravity, UA 06/17/2022 1.004  1.001 - 1.035 Final    pH, UA 06/17/2022 8.0  5.0 - 8.0 Final    Glucose, UA 06/17/2022 NEGATIVE  NEGATIVE Final    Bilirubin, UA 06/17/2022 NEGATIVE  NEGATIVE Final    Ketones, UA 06/17/2022 NEGATIVE  NEGATIVE Final    Occult Blood UA 06/17/2022 NEGATIVE  NEGATIVE Final    Protein, UA 06/17/2022 NEGATIVE  NEGATIVE Final    Nitrite, UA 06/17/2022 NEGATIVE  NEGATIVE Final    Leukocytes, UA 06/17/2022 NEGATIVE  NEGATIVE Final    WBC Casts, UA 06/17/2022 NONE SEEN  < OR = 5 /HPF Final    RBC Casts, UA 06/17/2022 NONE SEEN  < OR = 2 /HPF Final    Squam Epithel, UA 06/17/2022 NONE SEEN  < OR = 5 /HPF Final    Bacteria, UA 06/17/2022 NONE SEEN  NONE SEEN /HPF Final    Hyaline Casts, UA 06/17/2022 NONE SEEN  NONE SEEN /LPF  "Final    Reflexive Urine Culture 06/17/2022    Final       Past Medical History:   Diagnosis Date    Osteoporosis, unspecified     Overactive bladder      Past Surgical History:   Procedure Laterality Date    COLONOSCOPY  2018    dr geronimo- rtc 5 yr     Family History   Problem Relation Age of Onset    Breast cancer Paternal Aunt        Marital Status:   Alcohol History:  has no history on file for alcohol use.  Tobacco History:  reports that she has never smoked. She has never used smokeless tobacco.  Drug History:  has no history on file for drug use.    Review of patient's allergies indicates:  No Known Allergies    Current Outpatient Medications:     busPIRone (BUSPAR) 10 MG tablet, Take 1 tablet (10 mg total) by mouth 3 (three) times daily as needed., Disp: 30 tablet, Rfl: 3    calcium citrate-vitamin D3 315-200 mg (CITRACAL+D) 315 mg-5 mcg (200 unit) per tablet, 1 tablet with meals, Disp: , Rfl:     doxycycline (VIBRAMYCIN) 100 MG Cap, 1 po qd with food x 2-4 wks prn rosacea flare, Disp: 30 capsule, Rfl: 3    metronidazole 0.75% (METROCREAM) 0.75 % Crea, Apply topically 2 (two) times daily. For rosacea, Disp: 45 g, Rfl: 6    oxybutynin (DITROPAN) 5 MG Tab, Take 2 tablets as needed for bladder, Disp: 60 tablet, Rfl: 3    oxybutynin (DITROPAN-XL) 10 MG 24 hr tablet, Take 1 tablet (10 mg total) by mouth once daily., Disp: 30 tablet, Rfl: 3    sulfamethoxazole-trimethoprim 800-160mg (BACTRIM DS) 800-160 mg Tab, Take 1 tablet by mouth 2 (two) times daily. for 10 days, Disp: 20 tablet, Rfl: 0    Review of Systems   Skin:  Positive for wound (Plantar aspect of foot).        Objective:      Vitals:    09/08/22 1536   BP: 128/82   Pulse: 70   SpO2: 98%   Weight: 64.4 kg (142 lb)   Height: 5' 6" (1.676 m)     Physical Exam  Constitutional:       General: She is not in acute distress.     Appearance: She is well-developed.   HENT:      Head: Normocephalic and atraumatic.   Eyes:      Conjunctiva/sclera: " Conjunctivae normal.      Pupils: Pupils are equal, round, and reactive to light.   Neck:      Thyroid: No thyromegaly.   Cardiovascular:      Rate and Rhythm: Normal rate and regular rhythm.      Heart sounds: Normal heart sounds.   Pulmonary:      Effort: Pulmonary effort is normal.      Breath sounds: Normal breath sounds.   Abdominal:      General: Bowel sounds are normal. There is no distension.      Palpations: Abdomen is soft.      Tenderness: There is no abdominal tenderness.   Musculoskeletal:         General: Normal range of motion.      Cervical back: Normal range of motion and neck supple.   Skin:     General: Skin is warm and dry.      Findings: No erythema.      Comments: Left plantar aspect of foot, puncture wound seen with surrounding erythema and slight warmth.  Tender to palpation   Neurological:      Mental Status: She is alert and oriented to person, place, and time.      Cranial Nerves: No cranial nerve deficit.         Assessment:       1. Foreign body in left foot, initial encounter    2. Puncture wound of left foot, initial encounter         Plan:       Foreign body in left foot, initial encounter  Comments:  appears to have removed but will check xray now. cover with bactrim for possible staph cellulitis. update tetanus due to puncture wound of foot.  Orders:  -     X-Ray Foot Complete 3 view Left; Future; Expected date: 09/08/2022  -     sulfamethoxazole-trimethoprim 800-160mg (BACTRIM DS) 800-160 mg Tab; Take 1 tablet by mouth 2 (two) times daily. for 10 days  Dispense: 20 tablet; Refill: 0  -     (In Office Administered) Td Vaccine    Puncture wound of left foot, initial encounter  -     (In Office Administered) Td Vaccine    Follow up if symptoms worsen or fail to improve.        9/8/2022 Dakota Barros PA-C

## 2022-09-13 ENCOUNTER — PATIENT MESSAGE (OUTPATIENT)
Dept: FAMILY MEDICINE | Facility: CLINIC | Age: 62
End: 2022-09-13

## 2022-10-24 ENCOUNTER — CLINICAL SUPPORT (OUTPATIENT)
Dept: FAMILY MEDICINE | Facility: CLINIC | Age: 62
End: 2022-10-24
Payer: COMMERCIAL

## 2022-10-24 DIAGNOSIS — Z23 NEED FOR INFLUENZA VACCINATION: Primary | ICD-10-CM

## 2022-10-24 PROCEDURE — 90682 FLU VACCINE - QUADRIVALENT (RECOMBINANT) PRESERVATIVE FREE: ICD-10-PCS | Mod: S$GLB,,, | Performed by: FAMILY MEDICINE

## 2022-10-24 PROCEDURE — 90471 IMMUNIZATION ADMIN: CPT | Mod: S$GLB,,, | Performed by: FAMILY MEDICINE

## 2022-10-24 PROCEDURE — 90682 RIV4 VACC RECOMBINANT DNA IM: CPT | Mod: S$GLB,,, | Performed by: FAMILY MEDICINE

## 2022-10-24 PROCEDURE — 90471 FLU VACCINE - QUADRIVALENT (RECOMBINANT) PRESERVATIVE FREE: ICD-10-PCS | Mod: S$GLB,,, | Performed by: FAMILY MEDICINE

## 2022-10-31 ENCOUNTER — HOSPITAL ENCOUNTER (OUTPATIENT)
Dept: RADIOLOGY | Facility: HOSPITAL | Age: 62
Discharge: HOME | End: 2022-10-31
Attending: FAMILY MEDICINE
Payer: COMMERCIAL

## 2022-10-31 DIAGNOSIS — Z12.31 OTHER SCREENING MAMMOGRAM: ICD-10-CM

## 2022-10-31 PROCEDURE — 77067 SCR MAMMO BI INCL CAD: CPT | Mod: TC,PO

## 2022-10-31 PROCEDURE — 77063 BREAST TOMOSYNTHESIS BI: CPT | Mod: TC,PO

## 2022-11-07 ENCOUNTER — TELEPHONE (OUTPATIENT)
Dept: FAMILY MEDICINE | Facility: CLINIC | Age: 62
End: 2022-11-07

## 2022-11-07 NOTE — TELEPHONE ENCOUNTER
----- Message from Fan Bosch MD sent at 11/6/2022  9:56 AM CST -----  Call patient.  Mammogram was normal.  Repeat mammogram in 1 year.

## 2022-12-06 ENCOUNTER — OFFICE VISIT (OUTPATIENT)
Dept: PODIATRY | Facility: CLINIC | Age: 62
End: 2022-12-06
Payer: COMMERCIAL

## 2022-12-06 VITALS
WEIGHT: 142 LBS | BODY MASS INDEX: 22.82 KG/M2 | OXYGEN SATURATION: 98 % | HEART RATE: 83 BPM | RESPIRATION RATE: 16 BRPM | HEIGHT: 66 IN

## 2022-12-06 DIAGNOSIS — D23.72 BENIGN NEOPLASM OF SKIN OF LEFT LOWER EXTREMITY, INCLUDING HIP: ICD-10-CM

## 2022-12-06 DIAGNOSIS — M79.672 FOOT PAIN, LEFT: Primary | ICD-10-CM

## 2022-12-06 PROCEDURE — 1160F RVW MEDS BY RX/DR IN RCRD: CPT | Mod: CPTII,S$GLB,, | Performed by: PODIATRIST

## 2022-12-06 PROCEDURE — 1160F PR REVIEW ALL MEDS BY PRESCRIBER/CLIN PHARMACIST DOCUMENTED: ICD-10-PCS | Mod: CPTII,S$GLB,, | Performed by: PODIATRIST

## 2022-12-06 PROCEDURE — 99203 OFFICE O/P NEW LOW 30 MIN: CPT | Mod: 25,S$GLB,, | Performed by: PODIATRIST

## 2022-12-06 PROCEDURE — 3008F PR BODY MASS INDEX (BMI) DOCUMENTED: ICD-10-PCS | Mod: CPTII,S$GLB,, | Performed by: PODIATRIST

## 2022-12-06 PROCEDURE — 17110 PR DESTRUCTION BENIGN LESIONS UP TO 14: ICD-10-PCS | Mod: LT,S$GLB,, | Performed by: PODIATRIST

## 2022-12-06 PROCEDURE — 17110 DESTRUCTION B9 LES UP TO 14: CPT | Mod: LT,S$GLB,, | Performed by: PODIATRIST

## 2022-12-06 PROCEDURE — 3008F BODY MASS INDEX DOCD: CPT | Mod: CPTII,S$GLB,, | Performed by: PODIATRIST

## 2022-12-06 PROCEDURE — 99203 PR OFFICE/OUTPT VISIT, NEW, LEVL III, 30-44 MIN: ICD-10-PCS | Mod: 25,S$GLB,, | Performed by: PODIATRIST

## 2022-12-06 PROCEDURE — 1159F PR MEDICATION LIST DOCUMENTED IN MEDICAL RECORD: ICD-10-PCS | Mod: CPTII,S$GLB,, | Performed by: PODIATRIST

## 2022-12-06 PROCEDURE — 1159F MED LIST DOCD IN RCRD: CPT | Mod: CPTII,S$GLB,, | Performed by: PODIATRIST

## 2022-12-06 NOTE — PROGRESS NOTES
"  [address] [facility] [address]  [place] [address]  Phone: [phone]   [phone]    Patient's PCP:Fan Bosch MD  Referring Provider: Aaareferral Self    Subjective:      Chief Complaint:: Foot Pain (Left Foot pain under second and third toe)    Foot Pain    Ani Matthews is a 62 y.o. female who presents today with a complaint of left foot pain under second and third toe lasting since September. Onset of symptoms after stepping on glass and reports trauma.  Current symptoms include aching pain when walking.  Aggravating factors are walking and baring weight. Symptoms have stayed the same. Treatment to date seen her pcp due to pain and pcp told her she had staph infection from digging out the glass and was prescribed antibiotics. Patient said the symptoms got better after meds; however, now the symptoms have returned and she said she has what looks like a callous in the area. Patient states she has trouble walking on it.       Vitals:    12/06/22 0852   Pulse: 83   Resp: 16   SpO2: 98%   Weight: 64.4 kg (142 lb)   Height: 5' 6" (1.676 m)   PainSc:   2      Shoe Size: 7.5    Past Surgical History:   Procedure Laterality Date    COLONOSCOPY  2018    dr geronimo- rtc 5 yr     Past Medical History:   Diagnosis Date    Osteoporosis, unspecified     Overactive bladder      Family History   Problem Relation Age of Onset    Breast cancer Paternal Aunt         Social History:   Marital Status:   Alcohol History:  has no history on file for alcohol use.  Tobacco History:  reports that she has never smoked. She has never used smokeless tobacco.  Drug History:  has no history on file for drug use.    Review of patient's allergies indicates:  No Known Allergies    Current Outpatient Medications   Medication Sig Dispense Refill    busPIRone (BUSPAR) 10 MG tablet Take 1 tablet (10 mg total) by mouth 3 (three) times daily as needed. 30 tablet 3    calcium citrate-vitamin D3 315-200 mg (CITRACAL+D) 315 mg-5 mcg " (200 unit) per tablet 1 tablet with meals      doxycycline (VIBRAMYCIN) 100 MG Cap 1 po qd with food x 2-4 wks prn rosacea flare 30 capsule 3    metronidazole 0.75% (METROCREAM) 0.75 % Crea Apply topically 2 (two) times daily. For rosacea 45 g 6    oxybutynin (DITROPAN) 5 MG Tab Take 2 tablets as needed for bladder 60 tablet 3    oxybutynin (DITROPAN-XL) 10 MG 24 hr tablet Take 1 tablet (10 mg total) by mouth once daily. 30 tablet 3     No current facility-administered medications for this visit.       Review of Systems      Objective:        Physical Exam:   Foot Exam    General  General Appearance: appears stated age and healthy   Orientation: alert and oriented to person, place, and time   Affect: appropriate   Gait: antalgic       Left Foot/Ankle      Inspection and Palpation  Ecchymosis: none  Tenderness: (Nucleated keratotic lesion plantar 2nd metatarsal head)  Swelling: none   Skin Exam: (Nucleated keratotic lesion plantar 2nd metatarsal head possible foreign body granuloma)  Neurovascular  Dorsalis pedis: 2+  Posterior tibial: 2+  Capillary refill: 2+  Saphenous nerve sensation: normal  Tibial nerve sensation: normal  Superficial peroneal nerve sensation: normal  Deep peroneal nerve sensation: normal  Sural nerve sensation: normal      Physical Exam  Cardiovascular:      Pulses:           Dorsalis pedis pulses are 2+ on the left side.        Posterior tibial pulses are 2+ on the left side.   Musculoskeletal:        Feet:                  Vascular Exam       Left Pulses  Dorsalis Pedis:      2+  Posterior Tibial:      2+         Imaging:            Assessment:       1. Foot pain, left      Plan:   Foot pain, left    Evaluated patient told it is possibility that she may have retained foreign object in her foot or that could be that the puncture wound is become keratotic and causing the pain with nucleation.  We are going to treated as a nucleated keratotic lesion with Cantharone if this does not resolve the  problem in the next 2-4 weeks then will look at diagnostic ultrasound and excision.    benign skin lesion:  I explained the lesion to the pt. and the treatment options of 1)  periodic debridement and off loading of the lesion.  2)  Canthrone treatment plan,  3)  Curretage of the lesion.  I explained there was no guarantee with any of the treatments that the lesion would comletely resolve or not reoccur.     Return in 2 weeks    Procedures Cantharone treatment of benign skin lesions - plantar 2nd metatarsal head left foot. Informed consent was obtained, a time-out was called, hyperkeratotic skin was debrided from each lesion utilizing a scapel, Cantharone acid was applied, and was covered with a Band-Aid. Written and verbal instructions were given to the patient. Patient tolerated the procedure well.     Instructions After Cantharone Acid Treatment    Keep dry for 3 days  If a blister forms, pop it  After 3rd day, begin wart stick twice daily for two week  Follow-up appointment 2 weeks      Wart Treatment: Twice Daily    Bathe area at night  File with pumice stone or nail file  Apply wart stick to affected area  Cover with a bad aid           Counseling:     I provided patient education verbally regarding:   Patient diagnosis, treatment options, as well as alternatives, risks, and benefits.     This note was created using Dragon voice recognition software that occasionally misinterpreted phrases or words.

## 2022-12-06 NOTE — PATIENT INSTRUCTIONS
INSTRUCTIONS FOLLOWING ACID TREATMENT:    Keep dry for 3 days  If a blister forms, pop it.  After the 3rd day begin using wart stick twice daily for 2 weeks  Follow-up appointment after 2 weeks      SKIN LESION TREATMENT:  TWICE DAILY    Wash affected area  File with a pumice stone or nail file  Dry area thoroughly  Apply Wart Stick to affected area  Cover with a Band-Aid

## 2022-12-13 ENCOUNTER — PATIENT MESSAGE (OUTPATIENT)
Dept: PODIATRY | Facility: CLINIC | Age: 62
End: 2022-12-13

## 2022-12-13 ENCOUNTER — OFFICE VISIT (OUTPATIENT)
Dept: PODIATRY | Facility: CLINIC | Age: 62
End: 2022-12-13
Payer: COMMERCIAL

## 2022-12-13 VITALS — BODY MASS INDEX: 22.26 KG/M2 | HEART RATE: 97 BPM | OXYGEN SATURATION: 96 % | HEIGHT: 66 IN | WEIGHT: 138.5 LBS

## 2022-12-13 DIAGNOSIS — M79.672 FOOT PAIN, LEFT: Primary | ICD-10-CM

## 2022-12-13 DIAGNOSIS — D23.72 BENIGN NEOPLASM OF SKIN OF LEFT LOWER EXTREMITY, INCLUDING HIP: ICD-10-CM

## 2022-12-13 PROCEDURE — 1159F MED LIST DOCD IN RCRD: CPT | Mod: CPTII,S$GLB,, | Performed by: PODIATRIST

## 2022-12-13 PROCEDURE — 3008F PR BODY MASS INDEX (BMI) DOCUMENTED: ICD-10-PCS | Mod: CPTII,S$GLB,, | Performed by: PODIATRIST

## 2022-12-13 PROCEDURE — 99213 PR OFFICE/OUTPT VISIT, EST, LEVL III, 20-29 MIN: ICD-10-PCS | Mod: 24,S$GLB,, | Performed by: PODIATRIST

## 2022-12-13 PROCEDURE — 1160F RVW MEDS BY RX/DR IN RCRD: CPT | Mod: CPTII,S$GLB,, | Performed by: PODIATRIST

## 2022-12-13 PROCEDURE — 1159F PR MEDICATION LIST DOCUMENTED IN MEDICAL RECORD: ICD-10-PCS | Mod: CPTII,S$GLB,, | Performed by: PODIATRIST

## 2022-12-13 PROCEDURE — 3008F BODY MASS INDEX DOCD: CPT | Mod: CPTII,S$GLB,, | Performed by: PODIATRIST

## 2022-12-13 PROCEDURE — 99213 OFFICE O/P EST LOW 20 MIN: CPT | Mod: 24,S$GLB,, | Performed by: PODIATRIST

## 2022-12-13 PROCEDURE — 1160F PR REVIEW ALL MEDS BY PRESCRIBER/CLIN PHARMACIST DOCUMENTED: ICD-10-PCS | Mod: CPTII,S$GLB,, | Performed by: PODIATRIST

## 2022-12-13 NOTE — PROGRESS NOTES
"  [address] [facility] [address] [place] [address]  Phone: [phone]   [phone]    Patient's PCP:Fan Bosch MD  Referring Provider: No ref. provider found    Subjective:      Chief Complaint:: Follow-up (Left foot pain)    LAUREN Matthews is a 62 y.o. female who presents today with a follow up complaint of left foot pain under second and third toe. Patient states she is using wart stick as directed.    Vitals:    12/13/22 1104   Pulse: 97   SpO2: 96%   Weight: 62.8 kg (138 lb 8 oz)   Height: 5' 6" (1.676 m)   PainSc:   6     Patient states her foot is getting worse.   Shoe Size:     Past Surgical History:   Procedure Laterality Date    COLONOSCOPY  2018    dr geronimo- rtc 5 yr     Past Medical History:   Diagnosis Date    Osteoporosis, unspecified     Overactive bladder      Family History   Problem Relation Age of Onset    Breast cancer Paternal Aunt         Social History:   Marital Status:   Alcohol History:  has no history on file for alcohol use.  Tobacco History:  reports that she has never smoked. She has never used smokeless tobacco.  Drug History:  has no history on file for drug use.    Review of patient's allergies indicates:  No Known Allergies    Current Outpatient Medications   Medication Sig Dispense Refill    busPIRone (BUSPAR) 10 MG tablet Take 1 tablet (10 mg total) by mouth 3 (three) times daily as needed. 30 tablet 3    calcium citrate-vitamin D3 315-200 mg (CITRACAL+D) 315 mg-5 mcg (200 unit) per tablet 1 tablet with meals      doxycycline (VIBRAMYCIN) 100 MG Cap 1 po qd with food x 2-4 wks prn rosacea flare 30 capsule 3    metronidazole 0.75% (METROCREAM) 0.75 % Crea Apply topically 2 (two) times daily. For rosacea 45 g 6    oxybutynin (DITROPAN) 5 MG Tab Take 2 tablets as needed for bladder 60 tablet 3    oxybutynin (DITROPAN-XL) 10 MG 24 hr tablet Take 1 tablet (10 mg total) by mouth once daily. 30 tablet 3     No current facility-administered medications for this " visit.       Review of Systems      Objective:        Physical Exam:   Foot Exam    General  General Appearance: appears stated age and healthy   Orientation: alert and oriented to person, place, and time   Affect: appropriate   Gait: antalgic         Physical Exam  Musculoskeletal:        Feet:        Ortho/SPM Exam     Imaging:            Assessment:       1. Foot pain, left    2. Benign neoplasm of skin of left lower extremity, including hip      Plan:   Foot pain, left    Benign neoplasm of skin of left lower extremity, including hip    Evaluated patient recommend she discontinue the Wart Stick for week lot of the skin the slough from the bottom of her foot.  Use accommodative padding.  If there is no lesion present once the skin slough then she can discontinue all treatments.  If there is still a questionable nucleated lesion to do the Wart Stick for 7 days.  Allow the skin slough.  If she is not sure whether this is completely resolved in 4 weeks she return to see me if she is doing well she will not return to see me      Procedures          Counseling:     I provided patient education verbally regarding:   Patient diagnosis, treatment options, as well as alternatives, risks, and benefits.   benign skin lesion:  I explained the lesion to the pt. and the treatment options of 1)  periodic debridement and off loading of the lesion.  2)  Canthrone treatment plan,  3)  Curretage of the lesion.  I explained there was no guarantee with any of the treatments that the lesion would comletely resolve or not reoccur.   This note was created using Dragon voice recognition software that occasionally misinterpreted phrases or words.

## 2022-12-18 ENCOUNTER — PATIENT MESSAGE (OUTPATIENT)
Dept: PODIATRY | Facility: CLINIC | Age: 62
End: 2022-12-18
Payer: COMMERCIAL

## 2022-12-19 ENCOUNTER — PATIENT MESSAGE (OUTPATIENT)
Dept: PODIATRY | Facility: CLINIC | Age: 62
End: 2022-12-19
Payer: COMMERCIAL

## 2022-12-27 ENCOUNTER — OFFICE VISIT (OUTPATIENT)
Dept: PODIATRY | Facility: CLINIC | Age: 62
End: 2022-12-27
Payer: COMMERCIAL

## 2022-12-27 VITALS — BODY MASS INDEX: 22.18 KG/M2 | HEIGHT: 66 IN | RESPIRATION RATE: 18 BRPM | WEIGHT: 138 LBS

## 2022-12-27 DIAGNOSIS — D23.72 BENIGN NEOPLASM OF SKIN OF LEFT LOWER EXTREMITY, INCLUDING HIP: Primary | ICD-10-CM

## 2022-12-27 DIAGNOSIS — M79.672 FOOT PAIN, LEFT: ICD-10-CM

## 2022-12-27 PROCEDURE — 3008F BODY MASS INDEX DOCD: CPT | Mod: CPTII,S$GLB,, | Performed by: PODIATRIST

## 2022-12-27 PROCEDURE — 1159F PR MEDICATION LIST DOCUMENTED IN MEDICAL RECORD: ICD-10-PCS | Mod: CPTII,S$GLB,, | Performed by: PODIATRIST

## 2022-12-27 PROCEDURE — 1160F PR REVIEW ALL MEDS BY PRESCRIBER/CLIN PHARMACIST DOCUMENTED: ICD-10-PCS | Mod: CPTII,S$GLB,, | Performed by: PODIATRIST

## 2022-12-27 PROCEDURE — 99213 OFFICE O/P EST LOW 20 MIN: CPT | Mod: S$GLB,,, | Performed by: PODIATRIST

## 2022-12-27 PROCEDURE — 3008F PR BODY MASS INDEX (BMI) DOCUMENTED: ICD-10-PCS | Mod: CPTII,S$GLB,, | Performed by: PODIATRIST

## 2022-12-27 PROCEDURE — 99213 PR OFFICE/OUTPT VISIT, EST, LEVL III, 20-29 MIN: ICD-10-PCS | Mod: S$GLB,,, | Performed by: PODIATRIST

## 2022-12-27 PROCEDURE — 1159F MED LIST DOCD IN RCRD: CPT | Mod: CPTII,S$GLB,, | Performed by: PODIATRIST

## 2022-12-27 PROCEDURE — 1160F RVW MEDS BY RX/DR IN RCRD: CPT | Mod: CPTII,S$GLB,, | Performed by: PODIATRIST

## 2022-12-27 NOTE — PROGRESS NOTES
"  [address] [facility] [address] [place] [address]  Phone: [phone]   [phone]    Patient's PCP:Fan Bosch MD  Referring Provider: No ref. provider found    Subjective:      Chief Complaint:: Follow-up (Left foot pain and skin lesion)    LAUREN Matthews is a 62 y.o. female who presents today with a complaint of Left foot pain and skin lesion.  Current symptoms include discoloration and peeling skin.  Aggravating factors are none. Treatment to date have included corn padding.    Vitals:    12/27/22 1155   Resp: 18   Weight: 62.6 kg (138 lb)   Height: 5' 6" (1.676 m)   PainSc: 0-No pain      Shoe Size:     Past Surgical History:   Procedure Laterality Date    COLONOSCOPY  2018    dr geronimo- rtc 5 yr     Past Medical History:   Diagnosis Date    Osteoporosis, unspecified     Overactive bladder      Family History   Problem Relation Age of Onset    Breast cancer Paternal Aunt         Social History:   Marital Status:   Alcohol History:  has no history on file for alcohol use.  Tobacco History:  reports that she has never smoked. She has never used smokeless tobacco.  Drug History:  has no history on file for drug use.    Review of patient's allergies indicates:  No Known Allergies    Current Outpatient Medications   Medication Sig Dispense Refill    busPIRone (BUSPAR) 10 MG tablet Take 1 tablet (10 mg total) by mouth 3 (three) times daily as needed. 30 tablet 3    calcium citrate-vitamin D3 315-200 mg (CITRACAL+D) 315 mg-5 mcg (200 unit) per tablet 1 tablet with meals      doxycycline (VIBRAMYCIN) 100 MG Cap 1 po qd with food x 2-4 wks prn rosacea flare 30 capsule 3    metronidazole 0.75% (METROCREAM) 0.75 % Crea Apply topically 2 (two) times daily. For rosacea 45 g 6    oxybutynin (DITROPAN) 5 MG Tab Take 2 tablets as needed for bladder 60 tablet 3    oxybutynin (DITROPAN-XL) 10 MG 24 hr tablet Take 1 tablet (10 mg total) by mouth once daily. 30 tablet 3     No current " facility-administered medications for this visit.       Review of Systems      Objective:        Physical Exam:   Foot Exam    General  General Appearance: appears stated age and healthy   Orientation: alert and oriented to person, place, and time   Affect: appropriate   Gait: antalgic         Physical Exam  Musculoskeletal:        Feet:        Ortho/SPM Exam     Imaging:            Assessment:       1. Benign neoplasm of skin of left lower extremity, including hip    2. Foot pain, left      Plan:   Benign neoplasm of skin of left lower extremity, including hip    Foot pain, left    Evaluated patient looks like the lesion is gone at this point in time.  I recommend she stop any acid therapy just monitor for any recurrence.  Use accommodative padding to relieve pressure on the lesion return to see me as needed.  I once again explained to her that what I have done is try to thin out the callus and see if she can keeping the control recommend inserts with a hole cut out in it.  As I explained to her if it reoccurs in his too painful we would have to consider doing a metatarsal osteotomy to elevate it decrease the pressure which is what we have been trying to avoid doing.  She understands she return as needed.      Procedures          Counseling:     I provided patient education verbally regarding:   Patient diagnosis, treatment options, as well as alternatives, risks, and benefits.     benign skin lesion:  I explained the lesion to the pt. and the treatment options of 1)  periodic debridement and off loading of the lesion.  2)  Canthrone treatment plan,  3)  Curretage of the lesion.  I explained there was no guarantee with any of the treatments that the lesion would comletely resolve or not reoccur.   This note was created using Dragon voice recognition software that occasionally misinterpreted phrases or words.

## 2023-04-04 ENCOUNTER — TELEPHONE (OUTPATIENT)
Dept: PODIATRY | Facility: CLINIC | Age: 63
End: 2023-04-04
Payer: COMMERCIAL

## 2023-04-19 ENCOUNTER — TELEPHONE (OUTPATIENT)
Dept: FAMILY MEDICINE | Facility: CLINIC | Age: 63
End: 2023-04-19

## 2023-04-19 NOTE — TELEPHONE ENCOUNTER
----- Message from Gena Villa sent at 4/19/2023  1:22 PM CDT -----  Pt dropped off a clearance form to be filled out.  Gave to Willow

## 2023-04-24 ENCOUNTER — TELEPHONE (OUTPATIENT)
Dept: FAMILY MEDICINE | Facility: CLINIC | Age: 63
End: 2023-04-24

## 2023-04-24 NOTE — TELEPHONE ENCOUNTER
----- Message from Nohelia Ang sent at 4/24/2023  9:30 AM CDT -----  Pt needs her surgical clearance formed faxed to Dr. Newberry for cataract surgery. Pt #[phone]

## 2023-06-19 ENCOUNTER — TELEPHONE (OUTPATIENT)
Dept: FAMILY MEDICINE | Facility: CLINIC | Age: 63
End: 2023-06-19

## 2023-06-19 DIAGNOSIS — Z79.899 ENCOUNTER FOR LONG-TERM (CURRENT) USE OF OTHER MEDICATIONS: Primary | ICD-10-CM

## 2023-06-19 DIAGNOSIS — Z00.00 WELLNESS EXAMINATION: ICD-10-CM

## 2023-06-19 DIAGNOSIS — E78.2 MIXED HYPERLIPIDEMIA: ICD-10-CM

## 2023-06-19 NOTE — TELEPHONE ENCOUNTER
----- Message from Kathleen Cisneros MA sent at 6/19/2023  9:34 AM CDT -----  Regarding: lab work  Pt calling to find out if her labs for her yearly appt has been put in for the [facility] lab in our office. Pt appt is on 6/27. [phone]

## 2023-06-21 LAB
ALBUMIN SERPL-MCNC: 4.3 G/DL (ref 3.6–5.1)
ALBUMIN/GLOB SERPL: 2 (CALC) (ref 1–2.5)
ALP SERPL-CCNC: 88 U/L (ref 37–153)
ALT SERPL-CCNC: 7 U/L (ref 6–29)
APPEARANCE UR: ABNORMAL
AST SERPL-CCNC: 15 U/L (ref 10–35)
BACTERIA #/AREA URNS HPF: ABNORMAL /HPF
BACTERIA UR CULT: ABNORMAL
BASOPHILS # BLD AUTO: 52 CELLS/UL (ref 0–200)
BASOPHILS NFR BLD AUTO: 1.1 %
BILIRUB SERPL-MCNC: 0.6 MG/DL (ref 0.2–1.2)
BILIRUB UR QL STRIP: NEGATIVE
BUN SERPL-MCNC: 12 MG/DL (ref 7–25)
BUN/CREAT SERPL: NORMAL (CALC) (ref 6–22)
CALCIUM SERPL-MCNC: 9.4 MG/DL (ref 8.6–10.4)
CHLORIDE SERPL-SCNC: 105 MMOL/L (ref 98–110)
CHOLEST SERPL-MCNC: 226 MG/DL
CHOLEST/HDLC SERPL: 3.6 (CALC)
CO2 SERPL-SCNC: 31 MMOL/L (ref 20–32)
COLOR UR: YELLOW
CREAT SERPL-MCNC: 0.68 MG/DL (ref 0.5–1.05)
EGFR: 97 ML/MIN/1.73M2
EOSINOPHIL # BLD AUTO: 99 CELLS/UL (ref 15–500)
EOSINOPHIL NFR BLD AUTO: 2.1 %
ERYTHROCYTE [DISTWIDTH] IN BLOOD BY AUTOMATED COUNT: 12.5 % (ref 11–15)
GLOBULIN SER CALC-MCNC: 2.2 G/DL (CALC) (ref 1.9–3.7)
GLUCOSE SERPL-MCNC: 88 MG/DL (ref 65–99)
GLUCOSE UR QL STRIP: NEGATIVE
HCT VFR BLD AUTO: 40.7 % (ref 35–45)
HDLC SERPL-MCNC: 63 MG/DL
HGB BLD-MCNC: 13.6 G/DL (ref 11.7–15.5)
HGB UR QL STRIP: NEGATIVE
HYALINE CASTS #/AREA URNS LPF: ABNORMAL /LPF
KETONES UR QL STRIP: NEGATIVE
LDLC SERPL CALC-MCNC: 148 MG/DL (CALC)
LEUKOCYTE ESTERASE UR QL STRIP: NEGATIVE
LYMPHOCYTES # BLD AUTO: 1485 CELLS/UL (ref 850–3900)
LYMPHOCYTES NFR BLD AUTO: 31.6 %
MCH RBC QN AUTO: 31.1 PG (ref 27–33)
MCHC RBC AUTO-ENTMCNC: 33.4 G/DL (ref 32–36)
MCV RBC AUTO: 92.9 FL (ref 80–100)
MONOCYTES # BLD AUTO: 390 CELLS/UL (ref 200–950)
MONOCYTES NFR BLD AUTO: 8.3 %
NEUTROPHILS # BLD AUTO: 2674 CELLS/UL (ref 1500–7800)
NEUTROPHILS NFR BLD AUTO: 56.9 %
NITRITE UR QL STRIP: NEGATIVE
NONHDLC SERPL-MCNC: 163 MG/DL (CALC)
PH UR STRIP: 7.5 [PH] (ref 5–8)
PLATELET # BLD AUTO: 304 THOUSAND/UL (ref 140–400)
PMV BLD REES-ECKER: 10 FL (ref 7.5–12.5)
POTASSIUM SERPL-SCNC: 4.5 MMOL/L (ref 3.5–5.3)
PROT SERPL-MCNC: 6.5 G/DL (ref 6.1–8.1)
PROT UR QL STRIP: NEGATIVE
RBC # BLD AUTO: 4.38 MILLION/UL (ref 3.8–5.1)
RBC #/AREA URNS HPF: ABNORMAL /HPF
SERVICE CMNT-IMP: ABNORMAL
SODIUM SERPL-SCNC: 141 MMOL/L (ref 135–146)
SP GR UR STRIP: 1.02 (ref 1–1.03)
SQUAMOUS #/AREA URNS HPF: ABNORMAL /HPF
TRIGL SERPL-MCNC: 54 MG/DL
TSH SERPL-ACNC: 1.95 MIU/L (ref 0.4–4.5)
WBC # BLD AUTO: 4.7 THOUSAND/UL (ref 3.8–10.8)
WBC #/AREA URNS HPF: ABNORMAL /HPF

## 2023-06-27 ENCOUNTER — OFFICE VISIT (OUTPATIENT)
Dept: FAMILY MEDICINE | Facility: CLINIC | Age: 63
End: 2023-06-27
Payer: MEDICARE

## 2023-06-27 VITALS
DIASTOLIC BLOOD PRESSURE: 82 MMHG | WEIGHT: 137 LBS | HEART RATE: 73 BPM | BODY MASS INDEX: 22.82 KG/M2 | SYSTOLIC BLOOD PRESSURE: 128 MMHG | HEIGHT: 65 IN

## 2023-06-27 DIAGNOSIS — F41.1 GENERALIZED ANXIETY DISORDER: ICD-10-CM

## 2023-06-27 DIAGNOSIS — Z12.31 OTHER SCREENING MAMMOGRAM: ICD-10-CM

## 2023-06-27 DIAGNOSIS — N32.81 OVERACTIVE BLADDER: ICD-10-CM

## 2023-06-27 DIAGNOSIS — E78.2 MIXED HYPERLIPIDEMIA: Primary | ICD-10-CM

## 2023-06-27 DIAGNOSIS — Z78.0 MENOPAUSE: ICD-10-CM

## 2023-06-27 DIAGNOSIS — M81.0 AGE-RELATED OSTEOPOROSIS WITHOUT CURRENT PATHOLOGICAL FRACTURE: ICD-10-CM

## 2023-06-27 DIAGNOSIS — Z13.820 ENCOUNTER FOR IMAGING TO ASSESS OSTEOPOROSIS: ICD-10-CM

## 2023-06-27 DIAGNOSIS — K21.9 GASTROESOPHAGEAL REFLUX DISEASE WITHOUT ESOPHAGITIS: ICD-10-CM

## 2023-06-27 PROCEDURE — 99214 PR OFFICE/OUTPT VISIT, EST, LEVL IV, 30-39 MIN: ICD-10-PCS | Mod: S$GLB,,, | Performed by: FAMILY MEDICINE

## 2023-06-27 PROCEDURE — 99214 OFFICE O/P EST MOD 30 MIN: CPT | Mod: S$GLB,,, | Performed by: FAMILY MEDICINE

## 2023-06-27 RX ORDER — BUSPIRONE HYDROCHLORIDE 10 MG/1
10 TABLET ORAL 3 TIMES DAILY PRN
Qty: 30 TABLET | Refills: 3 | Status: SHIPPED | OUTPATIENT
Start: 2023-06-27

## 2023-06-27 NOTE — PROGRESS NOTES
SUBJECTIVE:    Patient ID: Ani Matthews is a 63 y.o. female.    Chief Complaint: Annual Exam (No bottles, review Lab-results, no complaints, declined Dexa and pna vaccine, abc )    This 63-year-old female is here for yearly checkup.  She is under quite a bit of stress as she cares for her mother with dementia, father with CHF and chronic AFib, granddaughter now has focal segmental glomerulosclerosis at age 15 and needs a kidney transplant.  Patient wakes up anxious at night and sometimes has to take a half tablet BuSpar 10 mg to get back to sleep.    Bilateral cataract surgery went well with Dr. Newberry this year.  Her vision is good.    She is due for mammogram and DEXA scan this year.    Hyperlipidemia-eats a lot of ice cream.  Not much fried food    Osteoporosis-on calcium and D vitamins once a day    She declines vaccines for Shingrix and pneumonia      Telephone on 06/19/2023   Component Date Value Ref Range Status    WBC 06/20/2023 4.7  3.8 - 10.8 Thousand/uL Final    RBC 06/20/2023 4.38  3.80 - 5.10 Million/uL Final    Hemoglobin 06/20/2023 13.6  11.7 - 15.5 g/dL Final    Hematocrit 06/20/2023 40.7  35.0 - 45.0 % Final    MCV 06/20/2023 92.9  80.0 - 100.0 fL Final    MCH 06/20/2023 31.1  27.0 - 33.0 pg Final    MCHC 06/20/2023 33.4  32.0 - 36.0 g/dL Final    RDW 06/20/2023 12.5  11.0 - 15.0 % Final    Platelets 06/20/2023 304  140 - 400 Thousand/uL Final    MPV 06/20/2023 10.0  7.5 - 12.5 fL Final    Neutrophils, Abs 06/20/2023 2,674  1,500 - 7,800 cells/uL Final    Lymph # 06/20/2023 1,485  850 - 3,900 cells/uL Final    Mono # 06/20/2023 390  200 - 950 cells/uL Final    Eos # 06/20/2023 99  15 - 500 cells/uL Final    Baso # 06/20/2023 52  0 - 200 cells/uL Final    Neutrophils Relative 06/20/2023 56.9  % Final    Lymph % 06/20/2023 31.6  % Final    Mono % 06/20/2023 8.3  % Final    Eosinophil % 06/20/2023 2.1  % Final    Basophil % 06/20/2023 1.1  % Final    Glucose 06/20/2023 88  65 - 99 mg/dL Final     BUN 06/20/2023 12  7 - 25 mg/dL Final    Creatinine 06/20/2023 0.68  0.50 - 1.05 mg/dL Final    eGFR 06/20/2023 97  > OR = 60 mL/min/1.73m2 Final    BUN/Creatinine Ratio 06/20/2023 NOT APPLICABLE  6 - 22 (calc) Final    Sodium 06/20/2023 141  135 - 146 mmol/L Final    Potassium 06/20/2023 4.5  3.5 - 5.3 mmol/L Final    Chloride 06/20/2023 105  98 - 110 mmol/L Final    CO2 06/20/2023 31  20 - 32 mmol/L Final    Calcium 06/20/2023 9.4  8.6 - 10.4 mg/dL Final    Total Protein 06/20/2023 6.5  6.1 - 8.1 g/dL Final    Albumin 06/20/2023 4.3  3.6 - 5.1 g/dL Final    Globulin, Total 06/20/2023 2.2  1.9 - 3.7 g/dL (calc) Final    Albumin/Globulin Ratio 06/20/2023 2.0  1.0 - 2.5 (calc) Final    Total Bilirubin 06/20/2023 0.6  0.2 - 1.2 mg/dL Final    Alkaline Phosphatase 06/20/2023 88  37 - 153 U/L Final    AST 06/20/2023 15  10 - 35 U/L Final    ALT 06/20/2023 7  6 - 29 U/L Final    TSH w/reflex to FT4 06/20/2023 1.95  0.40 - 4.50 mIU/L Final    Color, UA 06/20/2023 YELLOW  YELLOW Final    Appearance, UA 06/20/2023 CLOUDY (A)  CLEAR Final    Specific Gravity, UA 06/20/2023 1.017  1.001 - 1.035 Final    pH, UA 06/20/2023 7.5  5.0 - 8.0 Final    Glucose, UA 06/20/2023 NEGATIVE  NEGATIVE Final    Bilirubin, UA 06/20/2023 NEGATIVE  NEGATIVE Final    Ketones, UA 06/20/2023 NEGATIVE  NEGATIVE Final    Occult Blood UA 06/20/2023 NEGATIVE  NEGATIVE Final    Protein, UA 06/20/2023 NEGATIVE  NEGATIVE Final    Nitrite, UA 06/20/2023 NEGATIVE  NEGATIVE Final    Leukocytes, UA 06/20/2023 NEGATIVE  NEGATIVE Final    WBC Casts, UA 06/20/2023 NONE SEEN  < OR = 5 /HPF Final    RBC Casts, UA 06/20/2023 0-2  < OR = 2 /HPF Final    Squam Epithel, UA 06/20/2023 NONE SEEN  < OR = 5 /HPF Final    Bacteria, UA 06/20/2023 NONE SEEN  NONE SEEN /HPF Final    Hyaline Casts, UA 06/20/2023 NONE SEEN  NONE SEEN /LPF Final    Service Cmt: 06/20/2023    Final    Reflexive Urine Culture 06/20/2023    Final    Cholesterol 06/20/2023 226 (H)  <200 mg/dL Final     HDL 06/20/2023 63  > OR = 50 mg/dL Final    Triglycerides 06/20/2023 54  <150 mg/dL Final    LDL Cholesterol 06/20/2023 148 (H)  mg/dL (calc) Final    HDL/Cholesterol Ratio 06/20/2023 3.6  <5.0 (calc) Final    Non HDL Chol. (LDL+VLDL) 06/20/2023 163 (H)  <130 mg/dL (calc) Final       Past Medical History:   Diagnosis Date    Osteoporosis, unspecified     Overactive bladder      Social History     Socioeconomic History    Marital status:    Tobacco Use    Smoking status: Never    Smokeless tobacco: Never     Past Surgical History:   Procedure Laterality Date    COLONOSCOPY  2018    dr geronimo- rtc 5 yr     Family History   Problem Relation Age of Onset    Breast cancer Paternal Aunt        Review of patient's allergies indicates:  No Known Allergies    Current Outpatient Medications:     calcium citrate-vitamin D3 315-200 mg (CITRACAL+D) 315 mg-5 mcg (200 unit) per tablet, 1 tablet with meals, Disp: , Rfl:     oxybutynin (DITROPAN) 5 MG Tab, Take 2 tablets as needed for bladder, Disp: 60 tablet, Rfl: 3    busPIRone (BUSPAR) 10 MG tablet, Take 1 tablet (10 mg total) by mouth 3 (three) times daily as needed., Disp: 30 tablet, Rfl: 3    doxycycline (VIBRAMYCIN) 100 MG Cap, 1 po qd with food x 2-4 wks prn rosacea flare (Patient not taking: Reported on 6/27/2023), Disp: 30 capsule, Rfl: 3    metronidazole 0.75% (METROCREAM) 0.75 % Crea, Apply topically 2 (two) times daily. For rosacea, Disp: 45 g, Rfl: 6    Review of Systems   Constitutional:  Negative for appetite change, chills, fatigue, fever and unexpected weight change.   HENT:  Negative for ear discharge and ear pain.    Eyes:  Negative for pain, discharge and visual disturbance.   Respiratory:  Negative for apnea, cough, shortness of breath and wheezing.    Cardiovascular:  Negative for chest pain, palpitations and leg swelling.   Gastrointestinal:  Negative for abdominal pain, blood in stool, constipation, diarrhea, nausea, vomiting and reflux.  "  Endocrine: Negative for cold intolerance, heat intolerance and polydipsia.   Genitourinary:  Negative for bladder incontinence, dysuria, hematuria, nocturia and urgency.   Musculoskeletal:  Negative for gait problem, joint swelling and myalgias.   Neurological:  Negative for dizziness, seizures and numbness.   Psychiatric/Behavioral:  Positive for sleep disturbance. Negative for behavioral problems and hallucinations. The patient is nervous/anxious.          Objective:      Vitals:    06/27/23 0838   BP: 128/82   Pulse: 73   Weight: 62.1 kg (137 lb)   Height: 5' 4.5" (1.638 m)     Physical Exam  Vitals and nursing note reviewed.   Constitutional:       General: She is not in acute distress.     Appearance: Normal appearance. She is well-developed. She is not toxic-appearing.   HENT:      Head: Normocephalic and atraumatic.      Right Ear: Tympanic membrane and external ear normal.      Left Ear: Tympanic membrane and external ear normal.      Nose: Nose normal.      Mouth/Throat:      Pharynx: Oropharynx is clear.   Eyes:      Pupils: Pupils are equal, round, and reactive to light.   Neck:      Thyroid: No thyromegaly.      Vascular: No carotid bruit.   Cardiovascular:      Rate and Rhythm: Normal rate and regular rhythm.      Heart sounds: Normal heart sounds. No murmur heard.  Pulmonary:      Effort: Pulmonary effort is normal.      Breath sounds: Normal breath sounds. No wheezing or rales.   Abdominal:      General: Bowel sounds are normal. There is no distension.      Palpations: Abdomen is soft.      Tenderness: There is no abdominal tenderness.   Musculoskeletal:         General: No tenderness or deformity. Normal range of motion.      Cervical back: Normal range of motion and neck supple.      Lumbar back: Normal. No spasms.      Comments: Bends 90 degrees at  waist   Lymphadenopathy:      Cervical: No cervical adenopathy.   Skin:     General: Skin is warm and dry.      Findings: No rash.   Neurological:    "   Mental Status: She is alert and oriented to person, place, and time. Mental status is at baseline.      Cranial Nerves: No cranial nerve deficit.      Coordination: Coordination normal.   Psychiatric:         Mood and Affect: Mood is anxious.         Behavior: Behavior normal.         Thought Content: Thought content normal.         Judgment: Judgment normal.         Assessment:       1. Mixed hyperlipidemia    2. Generalized anxiety disorder    3. Other screening mammogram    4. Menopause    5. Encounter for imaging to assess osteoporosis    6. Overactive bladder    7. Gastroesophageal reflux disease without esophagitis    8. Age-related osteoporosis without current pathological fracture         Plan:       Mixed hyperlipidemia  Cholesterol 226 HDL 63 TG 54  cut back on fried foods and ice cream.  Generalized anxiety disorder  -     busPIRone (BUSPAR) 10 MG tablet; Take 1 tablet (10 mg total) by mouth 3 (three) times daily as needed.  Dispense: 30 tablet; Refill: 3  Continue BuSpar as needed  Other screening mammogram  -     Mammo Digital Screening Bilat w/ Cristopher; Future; Expected date: 06/27/2023  Mammogram ordered  Menopause  -     DXA Bone Density Axial Skeleton 1 or more sites; Future; Expected date: 06/27/2023  DEXA scan due  Encounter for imaging to assess osteoporosis  -     DXA Bone Density Axial Skeleton 1 or more sites; Future; Expected date: 06/27/2023    Overactive bladder    Gastroesophageal reflux disease without esophagitis    Age-related osteoporosis without current pathological fracture  Increase vitamin-D to twice a day    Follow up in about 1 year (around 6/27/2024), or Hyperlipidemia.        6/27/2023 Fan Bosch

## 2023-07-28 ENCOUNTER — TELEPHONE (OUTPATIENT)
Dept: FAMILY MEDICINE | Facility: CLINIC | Age: 63
End: 2023-07-28

## 2023-07-28 NOTE — TELEPHONE ENCOUNTER
Spoke with patient who states her granddaughter is on dialysis and needs a kidney transplant. Her son is the donor and she has to be with them for this. Started on August 10th she will be in quarantine with this going on and wants Dr. Bosch to know that she will need additional help for her parents. States they have home health but she wants to make sure they will continue with home health. States she does have a brother that is going to help them and she will be able to send groceries, etc. States she really just wanted Dr. Bosch to be aware of the upcoming situation and if he has any comments or concerns.   Aware Dr. Bosch is out until Monday and we will get back with her at that time if he does.    Printed.

## 2023-07-28 NOTE — TELEPHONE ENCOUNTER
----- Message from Nelia Fulton sent at 7/28/2023  9:49 AM CDT -----  - 9:14-pt is calling about her parents and would like to talk to horacio   [phone]

## 2023-10-04 RX ORDER — CELECOXIB 200 MG/1
200 CAPSULE ORAL 2 TIMES DAILY
Qty: 30 CAPSULE | Refills: 0 | Status: SHIPPED | OUTPATIENT
Start: 2023-10-04 | End: 2023-10-18 | Stop reason: SDUPTHER

## 2023-10-18 RX ORDER — CELECOXIB 200 MG/1
200 CAPSULE ORAL 2 TIMES DAILY
Qty: 60 CAPSULE | Refills: 2 | Status: SHIPPED | OUTPATIENT
Start: 2023-10-18

## 2023-10-18 NOTE — TELEPHONE ENCOUNTER
----- Message from Dora Boyd sent at 10/18/2023  9:22 AM CDT -----   Dr.. Bosch called in some medication for the patients RT arm and shoulder pain.  The medication that was called in worked  85 to 90 %. She is still having some dull pain.  Her prescription runs out in 1 day. Should she get another round of the medication .[facility] on [address]  Pt's # [phone] GH

## 2023-10-27 ENCOUNTER — TELEPHONE (OUTPATIENT)
Dept: FAMILY MEDICINE | Facility: CLINIC | Age: 63
End: 2023-10-27

## 2023-11-09 ENCOUNTER — OFFICE VISIT (OUTPATIENT)
Dept: FAMILY MEDICINE | Facility: CLINIC | Age: 63
End: 2023-11-09
Attending: FAMILY MEDICINE
Payer: MEDICARE

## 2023-11-09 VITALS
BODY MASS INDEX: 23.32 KG/M2 | WEIGHT: 140 LBS | SYSTOLIC BLOOD PRESSURE: 120 MMHG | DIASTOLIC BLOOD PRESSURE: 80 MMHG | HEIGHT: 65 IN | HEART RATE: 90 BPM

## 2023-11-09 DIAGNOSIS — K21.9 GASTROESOPHAGEAL REFLUX DISEASE WITHOUT ESOPHAGITIS: ICD-10-CM

## 2023-11-09 DIAGNOSIS — E78.2 MIXED HYPERLIPIDEMIA: ICD-10-CM

## 2023-11-09 DIAGNOSIS — Z23 NEED FOR VACCINATION: ICD-10-CM

## 2023-11-09 DIAGNOSIS — M75.41 ROTATOR CUFF IMPINGEMENT SYNDROME OF RIGHT SHOULDER: Primary | ICD-10-CM

## 2023-11-09 DIAGNOSIS — M81.0 AGE-RELATED OSTEOPOROSIS WITHOUT CURRENT PATHOLOGICAL FRACTURE: ICD-10-CM

## 2023-11-09 DIAGNOSIS — F41.1 GENERALIZED ANXIETY DISORDER: ICD-10-CM

## 2023-11-09 DIAGNOSIS — F51.01 PRIMARY INSOMNIA: ICD-10-CM

## 2023-11-09 PROCEDURE — 99214 OFFICE O/P EST MOD 30 MIN: CPT | Mod: S$GLB,,, | Performed by: FAMILY MEDICINE

## 2023-11-09 PROCEDURE — 90686 FLU VACCINE (QUAD) GREATER THAN OR EQUAL TO 3YO PRESERVATIVE FREE IM: ICD-10-PCS | Mod: S$GLB,,, | Performed by: FAMILY MEDICINE

## 2023-11-09 PROCEDURE — G0008 ADMIN INFLUENZA VIRUS VAC: HCPCS | Mod: S$GLB,,, | Performed by: FAMILY MEDICINE

## 2023-11-09 PROCEDURE — G0008 FLU VACCINE (QUAD) GREATER THAN OR EQUAL TO 3YO PRESERVATIVE FREE IM: ICD-10-PCS | Mod: S$GLB,,, | Performed by: FAMILY MEDICINE

## 2023-11-09 PROCEDURE — 99214 PR OFFICE/OUTPT VISIT, EST, LEVL IV, 30-39 MIN: ICD-10-PCS | Mod: S$GLB,,, | Performed by: FAMILY MEDICINE

## 2023-11-09 PROCEDURE — 90686 IIV4 VACC NO PRSV 0.5 ML IM: CPT | Mod: S$GLB,,, | Performed by: FAMILY MEDICINE

## 2023-11-09 NOTE — PROGRESS NOTES
SUBJECTIVE:    Patient ID: Ani Matthews is a 63 y.o. female.    Chief Complaint: Shoulder Pain (Brought bottles, mammogram tomorrow, discuss about Dexa low dose flu vaccine ordered, , abc )    This 63-year-old he is complaining of right shoulder pain and discomfort.  Is disrupting her sleep.  She did get some good improvement when she took Celebrex 100 mg twice a day for 2 weeks.  Unfortunately pain is now returning.  She has trouble reaching behind her back.    Appetite is good, no nausea vomiting or GERD.    Insomnia-when under stress from her family members she does take Benadryl at night.    Hyperlipidemia-wants to try red yeast rice for a few more months and recheck her cholesterol.    2019-colonoscopy with Dr. Clark-RTC 5 years    Due for a mammogram tomorrow, will schedule a DEXA scan with her mother soon.    Willing to get a flu shot today.        Telephone on 06/19/2023   Component Date Value Ref Range Status    WBC 06/20/2023 4.7  3.8 - 10.8 Thousand/uL Final    RBC 06/20/2023 4.38  3.80 - 5.10 Million/uL Final    Hemoglobin 06/20/2023 13.6  11.7 - 15.5 g/dL Final    Hematocrit 06/20/2023 40.7  35.0 - 45.0 % Final    MCV 06/20/2023 92.9  80.0 - 100.0 fL Final    MCH 06/20/2023 31.1  27.0 - 33.0 pg Final    MCHC 06/20/2023 33.4  32.0 - 36.0 g/dL Final    RDW 06/20/2023 12.5  11.0 - 15.0 % Final    Platelets 06/20/2023 304  140 - 400 Thousand/uL Final    MPV 06/20/2023 10.0  7.5 - 12.5 fL Final    Neutrophils, Abs 06/20/2023 2,674  1,500 - 7,800 cells/uL Final    Lymph # 06/20/2023 1,485  850 - 3,900 cells/uL Final    Mono # 06/20/2023 390  200 - 950 cells/uL Final    Eos # 06/20/2023 99  15 - 500 cells/uL Final    Baso # 06/20/2023 52  0 - 200 cells/uL Final    Neutrophils Relative 06/20/2023 56.9  % Final    Lymph % 06/20/2023 31.6  % Final    Mono % 06/20/2023 8.3  % Final    Eosinophil % 06/20/2023 2.1  % Final    Basophil % 06/20/2023 1.1  % Final    Glucose 06/20/2023 88  65 - 99 mg/dL Final     BUN 06/20/2023 12  7 - 25 mg/dL Final    Creatinine 06/20/2023 0.68  0.50 - 1.05 mg/dL Final    eGFR 06/20/2023 97  > OR = 60 mL/min/1.73m2 Final    BUN/Creatinine Ratio 06/20/2023 NOT APPLICABLE  6 - 22 (calc) Final    Sodium 06/20/2023 141  135 - 146 mmol/L Final    Potassium 06/20/2023 4.5  3.5 - 5.3 mmol/L Final    Chloride 06/20/2023 105  98 - 110 mmol/L Final    CO2 06/20/2023 31  20 - 32 mmol/L Final    Calcium 06/20/2023 9.4  8.6 - 10.4 mg/dL Final    Total Protein 06/20/2023 6.5  6.1 - 8.1 g/dL Final    Albumin 06/20/2023 4.3  3.6 - 5.1 g/dL Final    Globulin, Total 06/20/2023 2.2  1.9 - 3.7 g/dL (calc) Final    Albumin/Globulin Ratio 06/20/2023 2.0  1.0 - 2.5 (calc) Final    Total Bilirubin 06/20/2023 0.6  0.2 - 1.2 mg/dL Final    Alkaline Phosphatase 06/20/2023 88  37 - 153 U/L Final    AST 06/20/2023 15  10 - 35 U/L Final    ALT 06/20/2023 7  6 - 29 U/L Final    TSH w/reflex to FT4 06/20/2023 1.95  0.40 - 4.50 mIU/L Final    Color, UA 06/20/2023 YELLOW  YELLOW Final    Appearance, UA 06/20/2023 CLOUDY (A)  CLEAR Final    Specific Gravity, UA 06/20/2023 1.017  1.001 - 1.035 Final    pH, UA 06/20/2023 7.5  5.0 - 8.0 Final    Glucose, UA 06/20/2023 NEGATIVE  NEGATIVE Final    Bilirubin, UA 06/20/2023 NEGATIVE  NEGATIVE Final    Ketones, UA 06/20/2023 NEGATIVE  NEGATIVE Final    Occult Blood UA 06/20/2023 NEGATIVE  NEGATIVE Final    Protein, UA 06/20/2023 NEGATIVE  NEGATIVE Final    Nitrite, UA 06/20/2023 NEGATIVE  NEGATIVE Final    Leukocytes, UA 06/20/2023 NEGATIVE  NEGATIVE Final    WBC Casts, UA 06/20/2023 NONE SEEN  < OR = 5 /HPF Final    RBC Casts, UA 06/20/2023 0-2  < OR = 2 /HPF Final    Squam Epithel, UA 06/20/2023 NONE SEEN  < OR = 5 /HPF Final    Bacteria, UA 06/20/2023 NONE SEEN  NONE SEEN /HPF Final    Hyaline Casts, UA 06/20/2023 NONE SEEN  NONE SEEN /LPF Final    Service Cmt: 06/20/2023    Final    Reflexive Urine Culture 06/20/2023    Final    Cholesterol 06/20/2023 226 (H)  <200 mg/dL Final     HDL 06/20/2023 63  > OR = 50 mg/dL Final    Triglycerides 06/20/2023 54  <150 mg/dL Final    LDL Cholesterol 06/20/2023 148 (H)  mg/dL (calc) Final    HDL/Cholesterol Ratio 06/20/2023 3.6  <5.0 (calc) Final    Non HDL Chol. (LDL+VLDL) 06/20/2023 163 (H)  <130 mg/dL (calc) Final       Past Medical History:   Diagnosis Date    Osteoporosis, unspecified     Overactive bladder      Social History     Socioeconomic History    Marital status:    Tobacco Use    Smoking status: Never    Smokeless tobacco: Never     Past Surgical History:   Procedure Laterality Date    COLONOSCOPY  2018    dr geronimo- rtc 5 yr     Family History   Problem Relation Age of Onset    Breast cancer Paternal Aunt        The 10-year CVD risk score (Williamino, et al., 2008) is: 7.1%    Values used to calculate the score:      Age: 63 years      Sex: Female      Diabetic: No      Tobacco smoker: No      Systolic Blood Pressure: 120 mmHg      Is BP treated: No      HDL Cholesterol: 63 mg/dL      Total Cholesterol: 226 mg/dL    Tests to Keep You Healthy    Mammogram: SCHEDULED  Colon Cancer Screening: Met on 2/4/2019      Review of patient's allergies indicates:  No Known Allergies    Current Outpatient Medications:     busPIRone (BUSPAR) 10 MG tablet, Take 1 tablet (10 mg total) by mouth 3 (three) times daily as needed., Disp: 30 tablet, Rfl: 3    calcium citrate-vitamin D3 315-200 mg (CITRACAL+D) 315 mg-5 mcg (200 unit) per tablet, 1 tablet with meals, Disp: , Rfl:     doxycycline (VIBRAMYCIN) 100 MG Cap, 1 po qd with food x 2-4 wks prn rosacea flare, Disp: 30 capsule, Rfl: 3    ketoconazole (NIZORAL) 2 % cream, AAA qd x 2-4 wks, Disp: 60 g, Rfl: 11    metronidazole 0.75% (METROCREAM) 0.75 % Crea, Apply topically 2 (two) times daily. For rosacea, Disp: 45 g, Rfl: 6    celecoxib (CELEBREX) 200 MG capsule, Take 1 capsule (200 mg total) by mouth 2 (two) times daily. (Patient not taking: Reported on 11/9/2023), Disp: 60 capsule, Rfl: 2     "cyclobenzaprine (FLEXERIL) 5 MG tablet, Take 5 mg by mouth., Disp: , Rfl:     oxybutynin (DITROPAN) 5 MG Tab, Take 2 tablets as needed for bladder (Patient not taking: Reported on 7/12/2023), Disp: 60 tablet, Rfl: 3    Review of Systems   Constitutional:  Negative for appetite change, chills, fatigue, fever and unexpected weight change.   HENT:  Negative for ear discharge and ear pain.    Eyes:  Negative for pain, discharge and visual disturbance.   Respiratory:  Negative for apnea, cough, shortness of breath and wheezing.    Cardiovascular:  Negative for chest pain, palpitations and leg swelling.   Gastrointestinal:  Negative for abdominal pain, blood in stool, constipation, diarrhea, nausea, vomiting and reflux.   Endocrine: Negative for cold intolerance, heat intolerance and polydipsia.   Genitourinary:  Negative for bladder incontinence, dysuria, hematuria, nocturia and urgency.   Musculoskeletal:  Positive for arthralgias (Right shoulder impingement pain). Negative for gait problem, joint swelling and myalgias.   Neurological:  Negative for dizziness, seizures and numbness.   Psychiatric/Behavioral:  Positive for sleep disturbance. Negative for behavioral problems and hallucinations. The patient is not nervous/anxious.            Objective:      Vitals:    11/09/23 1336   BP: 120/80   Pulse: 90   Weight: 63.5 kg (140 lb)   Height: 5' 4.5" (1.638 m)     Physical Exam  Vitals and nursing note reviewed.   Constitutional:       General: She is not in acute distress.     Appearance: Normal appearance. She is well-developed. She is not toxic-appearing.   HENT:      Head: Normocephalic and atraumatic.      Right Ear: Tympanic membrane and external ear normal.      Left Ear: Tympanic membrane and external ear normal.      Nose: Nose normal.      Mouth/Throat:      Pharynx: Oropharynx is clear.   Eyes:      Pupils: Pupils are equal, round, and reactive to light.   Neck:      Thyroid: No thyromegaly.      Vascular: No " carotid bruit.   Cardiovascular:      Rate and Rhythm: Normal rate and regular rhythm.      Heart sounds: Normal heart sounds. No murmur heard.  Pulmonary:      Effort: Pulmonary effort is normal.      Breath sounds: Normal breath sounds. No wheezing or rales.   Abdominal:      General: Bowel sounds are normal. There is no distension.      Palpations: Abdomen is soft.      Tenderness: There is no abdominal tenderness.   Musculoskeletal:         General: No tenderness or deformity. Normal range of motion.      Cervical back: Normal range of motion and neck supple.      Lumbar back: Normal. No spasms.      Comments: Bends 90 degrees at  waist, right shoulder has painful arc at 160°, decreased internal rotation.  Left shoulder has full range of motion without pain.  Knees have no crepitance.  No pitting edema to lower extremities   Lymphadenopathy:      Cervical: No cervical adenopathy.   Skin:     General: Skin is warm and dry.      Findings: No rash.   Neurological:      Mental Status: She is alert and oriented to person, place, and time. Mental status is at baseline.      Cranial Nerves: No cranial nerve deficit.      Coordination: Coordination normal.   Psychiatric:         Mood and Affect: Mood normal.         Behavior: Behavior normal.         Thought Content: Thought content normal.         Judgment: Judgment normal.           Assessment:       1. Rotator cuff impingement syndrome of right shoulder    2. Need for vaccination    3. Mixed hyperlipidemia    4. Generalized anxiety disorder    5. Age-related osteoporosis without current pathological fracture    6. Gastroesophageal reflux disease without esophagitis    7. Primary insomnia         Plan:       Rotator cuff impingement syndrome of right shoulder  Discussed various treatment options for rotator cuff impingement.  She is electing to retry Celebrex 100 mg b.i.d. for 3-4 more weeks.  After that we could consider Medrol Dosepak and/or steroid injections.   Shoulder exercises given to patient today.  Need for vaccination  -     Influenza - Quadrivalent *Preferred* (6 months+) (PF)  Flu vaccine today  Mixed hyperlipidemia  -     CBC Auto Differential; Future; Expected date: 11/09/2023  -     Comprehensive Metabolic Panel; Future; Expected date: 11/09/2023  -     Lipid Panel; Future; Expected date: 11/09/2023  -     Urinalysis, Reflex to Urine Culture Urine, Clean Catch; Future; Expected date: 11/09/2023  -     TSH w/reflex to FT4; Future; Expected date: 11/09/2023  Cholesterol 220s, LDL was elevated, continue red yeast rice for 6 more months  Generalized anxiety disorder    Age-related osteoporosis without current pathological fracture  DEXA scan due soon  Gastroesophageal reflux disease without esophagitis    Primary insomnia  Okay to use Tylenol p.m. or Benadryl for sleep    No follow-ups on file.        11/9/2023 Fan Bosch

## 2023-11-10 ENCOUNTER — HOSPITAL ENCOUNTER (OUTPATIENT)
Dept: RADIOLOGY | Facility: HOSPITAL | Age: 63
Discharge: HOME | End: 2023-11-10
Attending: FAMILY MEDICINE
Payer: MEDICARE

## 2023-11-10 DIAGNOSIS — Z12.31 OTHER SCREENING MAMMOGRAM: ICD-10-CM

## 2023-11-10 PROCEDURE — 77067 SCR MAMMO BI INCL CAD: CPT | Mod: TC,PO

## 2023-11-13 ENCOUNTER — TELEPHONE (OUTPATIENT)
Dept: FAMILY MEDICINE | Facility: CLINIC | Age: 63
End: 2023-11-13

## 2023-11-13 NOTE — TELEPHONE ENCOUNTER
----- Message from Fan Bosch MD sent at 11/11/2023  9:20 PM CST -----  Call patient.  Mammogram was normal.  Repeat mammogram in 1 year.

## 2023-11-14 DIAGNOSIS — N32.81 OAB (OVERACTIVE BLADDER): ICD-10-CM

## 2023-11-14 RX ORDER — OXYBUTYNIN CHLORIDE 5 MG/1
TABLET ORAL
Qty: 60 TABLET | Refills: 3 | Status: SHIPPED | OUTPATIENT
Start: 2023-11-14

## 2023-11-14 NOTE — TELEPHONE ENCOUNTER
----- Message from Nohelia Ang sent at 11/14/2023  8:54 AM CST -----  Refill for oxybutynin ER. [facility] on [address]. Pt #[phone]

## 2023-11-21 ENCOUNTER — TELEPHONE (OUTPATIENT)
Dept: FAMILY MEDICINE | Facility: CLINIC | Age: 63
End: 2023-11-21

## 2023-11-21 DIAGNOSIS — N32.81 OVERACTIVE BLADDER: Primary | ICD-10-CM

## 2023-11-21 RX ORDER — OXYBUTYNIN CHLORIDE 10 MG/1
10 TABLET, EXTENDED RELEASE ORAL DAILY
Qty: 30 TABLET | Refills: 3 | Status: SHIPPED | OUTPATIENT
Start: 2023-11-21 | End: 2024-11-20

## 2023-11-21 NOTE — TELEPHONE ENCOUNTER
Spoke with patient and let her know the prescription was sent on 11/14 - she said we put it in for the regular and Dr. Bosch gives her the regular and the extended release version. She states she takes them PRN and wants to know if Dr. Bosch can send an increased mg of the XR to the pharmacy.     Printed

## 2023-11-21 NOTE — TELEPHONE ENCOUNTER
----- Message from Nohelia Ang sent at 11/21/2023  9:20 AM CST -----  Refill for Oxybutynin XL and an increased strength.  [facility] [address]. Pt #[phone]

## 2023-11-29 ENCOUNTER — HOSPITAL ENCOUNTER (OUTPATIENT)
Dept: RADIOLOGY | Facility: HOSPITAL | Age: 63
Discharge: HOME | End: 2023-11-29
Attending: FAMILY MEDICINE
Payer: MEDICARE

## 2023-11-29 DIAGNOSIS — Z78.0 MENOPAUSE: ICD-10-CM

## 2023-11-29 DIAGNOSIS — Z13.820 ENCOUNTER FOR IMAGING TO ASSESS OSTEOPOROSIS: ICD-10-CM

## 2023-11-29 PROCEDURE — 77080 DXA BONE DENSITY AXIAL: CPT | Mod: TC,PO

## 2023-12-03 NOTE — PROGRESS NOTES
Call patient.  DEXA scan shows osteoporosis of the bones.  Treatment includes taking calcium plus D vitamins twice a day and adding alendronate 70 mg once a week.  Recheck DEXA scan in 2 years.

## 2023-12-04 ENCOUNTER — TELEPHONE (OUTPATIENT)
Dept: FAMILY MEDICINE | Facility: CLINIC | Age: 63
End: 2023-12-04

## 2023-12-04 RX ORDER — CALCIUM CARBONATE 600 MG
600 TABLET ORAL 2 TIMES DAILY WITH MEALS
COMMUNITY

## 2023-12-04 RX ORDER — CHOLECALCIFEROL (VITAMIN D3) 25 MCG
1000 TABLET ORAL 2 TIMES DAILY
COMMUNITY

## 2023-12-04 NOTE — TELEPHONE ENCOUNTER
Spoke to patient with results verbatim per Dr Bosch. Verbalized understanding on all, including to take Calcium 600mg BID and Vitamin D 1000 BID, added to med list. Said she wants to research Fosamax and will let us know if she wants to take that. FYI to Dr Bosch.

## 2023-12-04 NOTE — TELEPHONE ENCOUNTER
----- Message from Fan Bosch MD sent at 12/3/2023  2:57 PM CST -----  Call patient.  DEXA scan shows osteoporosis of the bones.  Treatment includes taking calcium plus D vitamins twice a day and adding alendronate 70 mg once a week.  Recheck DEXA scan in 2 years.

## 2024-02-27 DIAGNOSIS — Z00.00 ENCOUNTER FOR MEDICARE ANNUAL WELLNESS EXAM: ICD-10-CM

## 2024-03-14 ENCOUNTER — TELEPHONE (OUTPATIENT)
Dept: FAMILY MEDICINE | Facility: CLINIC | Age: 64
End: 2024-03-14
Payer: MEDICARE

## 2024-03-14 DIAGNOSIS — M25.511 ACUTE PAIN OF RIGHT SHOULDER: Primary | ICD-10-CM

## 2024-03-14 NOTE — TELEPHONE ENCOUNTER
Patient states she is needing an x-ray for her right shoulder. Has completed the Celebrex for inflammation and inflammation did not go away.   Right shoulder feels okay but when moving a certain way will experiencing a piercing pain and then will settle. No pain reported at this time. Has been doing the recommended shoulder exercises and did not notice a difference.   Patient is asking for Dr. Bosch recommendation on the next steps to take.

## 2024-03-14 NOTE — TELEPHONE ENCOUNTER
----- Message from Dora Boyd sent at 3/14/2024  9:39 AM CDT -----  The patient wants an X-Ray of her RT shoulder. Pt's # [phone]

## 2024-03-18 ENCOUNTER — HOSPITAL ENCOUNTER (OUTPATIENT)
Dept: RADIOLOGY | Facility: HOSPITAL | Age: 64
Discharge: HOME | End: 2024-03-18
Attending: FAMILY MEDICINE
Payer: MEDICARE

## 2024-03-18 DIAGNOSIS — M25.511 ACUTE PAIN OF RIGHT SHOULDER: ICD-10-CM

## 2024-03-18 PROCEDURE — 73030 X-RAY EXAM OF SHOULDER: CPT | Mod: TC,PO,RT

## 2024-03-24 NOTE — PROGRESS NOTES
Call patient.  The x-rays of her right shoulder looked normal.  If she is still having pain that she could have rotator cuff tendonitis or a tear.  If still in pain would recommend referring to Dr. King orthopedics for further treatment and possible cortisone treatment

## 2024-03-25 ENCOUNTER — TELEPHONE (OUTPATIENT)
Dept: FAMILY MEDICINE | Facility: CLINIC | Age: 64
End: 2024-03-25
Payer: MEDICARE

## 2024-03-25 NOTE — TELEPHONE ENCOUNTER
----- Message from Fan Bosch MD sent at 3/24/2024  6:09 PM CDT -----  Call patient.  The x-rays of her right shoulder looked normal.  If she is still having pain that she could have rotator cuff tendonitis or a tear.  If still in pain would recommend referring to Dr. King orthopedics for further treatment and possible cortisone treatment

## 2024-04-18 ENCOUNTER — PATIENT MESSAGE (OUTPATIENT)
Dept: FAMILY MEDICINE | Facility: CLINIC | Age: 64
End: 2024-04-18
Payer: MEDICARE

## 2024-05-27 ENCOUNTER — TELEPHONE (OUTPATIENT)
Dept: FAMILY MEDICINE | Facility: CLINIC | Age: 64
End: 2024-05-27
Payer: MEDICARE

## 2024-05-27 NOTE — TELEPHONE ENCOUNTER
Spoke with patient, states she is about to have labs done and just wanted to know her level from last year. I let her know it was 1.95

## 2024-05-27 NOTE — TELEPHONE ENCOUNTER
----- Message from Beatris Renee sent at 5/27/2024  9:05 AM CDT -----  Pt would like to check on her thyroid numbers from lab.  [phone]

## 2024-06-24 NOTE — TELEPHONE ENCOUNTER
"Spoke with patient and let her know per Dr. Bosch "celebrex 200mg 1 po BID #30."   "
----- Message from Nelia Fulton sent at 10/4/2023  8:30 AM CDT -----  Pt is in quarantine with her granddaughter kidney transplant for 3 months she is having shoulder pain. Keeping her up at night, a fever in her shoulder at times. Would like to know if she should use a sling and what to take as she can not leave the house.   [phone]    
Home

## 2024-06-26 ENCOUNTER — TELEPHONE (OUTPATIENT)
Dept: FAMILY MEDICINE | Facility: CLINIC | Age: 64
End: 2024-06-26
Payer: MEDICARE

## 2024-07-09 ENCOUNTER — OFFICE VISIT (OUTPATIENT)
Dept: FAMILY MEDICINE | Facility: CLINIC | Age: 64
End: 2024-07-09
Attending: FAMILY MEDICINE
Payer: MEDICARE

## 2024-07-09 VITALS
HEIGHT: 64 IN | HEART RATE: 82 BPM | DIASTOLIC BLOOD PRESSURE: 84 MMHG | BODY MASS INDEX: 24.11 KG/M2 | WEIGHT: 141.25 LBS | OXYGEN SATURATION: 97 % | SYSTOLIC BLOOD PRESSURE: 118 MMHG

## 2024-07-09 DIAGNOSIS — M75.41 ROTATOR CUFF IMPINGEMENT SYNDROME OF RIGHT SHOULDER: ICD-10-CM

## 2024-07-09 DIAGNOSIS — M81.0 AGE-RELATED OSTEOPOROSIS WITHOUT CURRENT PATHOLOGICAL FRACTURE: ICD-10-CM

## 2024-07-09 DIAGNOSIS — F41.1 GENERALIZED ANXIETY DISORDER: ICD-10-CM

## 2024-07-09 DIAGNOSIS — E78.2 MIXED HYPERLIPIDEMIA: ICD-10-CM

## 2024-07-09 DIAGNOSIS — K21.9 GASTROESOPHAGEAL REFLUX DISEASE WITHOUT ESOPHAGITIS: ICD-10-CM

## 2024-07-09 DIAGNOSIS — N32.81 OVERACTIVE BLADDER: Primary | ICD-10-CM

## 2024-07-09 DIAGNOSIS — L65.9 ALOPECIA: ICD-10-CM

## 2024-07-09 PROCEDURE — 99214 OFFICE O/P EST MOD 30 MIN: CPT | Mod: S$GLB,,, | Performed by: FAMILY MEDICINE

## 2024-07-10 PROBLEM — L65.9 ALOPECIA: Status: ACTIVE | Noted: 2024-07-10

## 2024-07-10 NOTE — PROGRESS NOTES
SUBJECTIVE:    Patient ID: Ani Matthews is a 64 y.o. female.    Chief Complaint: Annual Exam and Hyperlipidemia (No Bottles//refills needed//denies pneum vaccine//discuss rx-ERL)    64-year-old female who walks daily 20 minutes for exercise.  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  Is quite stressful and demanding of her.    2024-colonoscopy with Dr. Hammond-RTC 5 years    Overactive bladder, bladder seems to be not emptying well.  Takes oxybutynin XL only rarely    Right shoulder pains, cortisone injection with Dr. King has helped somewhat.  Right knee intermittent sharp pains    Osteoporosis-fearful of alendronate Prolia and other medications.  Is agreeable to calcium plus D vitamins    Hair is thinning out, saw Dermatology Dr. Berg-he recommended finasteride or Propecia, she is considering it        Hyperlipidemia  Pertinent negatives include no chest pain, myalgias or shortness of breath.       No visits with results within 6 Month(s) from this visit.   Latest known visit with results is:   Office Visit on 11/09/2023   Component Date Value Ref Range Status    WBC 07/08/2024 6.5  3.8 - 10.8 Thousand/uL Final    RBC 07/08/2024 4.46  3.80 - 5.10 Million/uL Final    Hemoglobin 07/08/2024 13.8  11.7 - 15.5 g/dL Final    Hematocrit 07/08/2024 42.1  35.0 - 45.0 % Final    MCV 07/08/2024 94.4  80.0 - 100.0 fL Final    MCH 07/08/2024 30.9  27.0 - 33.0 pg Final    MCHC 07/08/2024 32.8  32.0 - 36.0 g/dL Final    RDW 07/08/2024 12.7  11.0 - 15.0 % Final    Platelets 07/08/2024 311  140 - 400 Thousand/uL Final    MPV 07/08/2024 10.2  7.5 - 12.5 fL Final    Neutrophils, Abs 07/08/2024 3,998  1,500 - 7,800 cells/uL Final    Lymph # 07/08/2024 1,736  850 - 3,900 cells/uL Final    Mono # 07/08/2024 546  200 - 950 cells/uL Final    Eos # 07/08/2024 163  15 - 500 cells/uL Final    Baso # 07/08/2024 59  0 - 200 cells/uL Final    Neutrophils Relative 07/08/2024 61.5  % Final    Lymph %  07/08/2024 26.7  % Final    Mono % 07/08/2024 8.4  % Final    Eosinophil % 07/08/2024 2.5  % Final    Basophil % 07/08/2024 0.9  % Final       Past Medical History:   Diagnosis Date    Osteoporosis, unspecified     Overactive bladder      Social History     Socioeconomic History    Marital status:    Tobacco Use    Smoking status: Never    Smokeless tobacco: Never     Past Surgical History:   Procedure Laterality Date    COLONOSCOPY  2018    dr geronimo- rtc 5 yr     Family History   Problem Relation Name Age of Onset    Breast cancer Paternal Aunt         The 10-year CVD risk score (Jordan, et al., 2008) is: 7.1%    Values used to calculate the score:      Age: 64 years      Sex: Female      Diabetic: No      Tobacco smoker: No      Systolic Blood Pressure: 118 mmHg      Is BP treated: No      HDL Cholesterol: 63 mg/dL      Total Cholesterol: 226 mg/dL    All of your core healthy metrics are met.      Review of patient's allergies indicates:  No Known Allergies    Current Outpatient Medications:     busPIRone (BUSPAR) 10 MG tablet, Take 1 tablet (10 mg total) by mouth 3 (three) times daily as needed., Disp: 30 tablet, Rfl: 3    calcium carbonate (OS-WALDO) 600 mg calcium (1,500 mg) Tab, Take 600 mg by mouth 2 (two) times daily with meals., Disp: , Rfl:     calcium citrate-vitamin D3 315-200 mg (CITRACAL+D) 315 mg-5 mcg (200 unit) per tablet, 1 tablet with meals, Disp: , Rfl:     clotrimazole-betamethasone 1-0.05% (LOTRISONE) cream, Apply topically 2 (two) times daily. Use 3-4 days prn flare of flaking/peeling, Disp: 45 g, Rfl: 1    ketoconazole (NIZORAL) 2 % cream, AAA qd x 2-4 wks, Disp: 60 g, Rfl: 11    metronidazole 0.75% (METROCREAM) 0.75 % Crea, Apply topically 2 (two) times daily. For rosacea, Disp: 45 g, Rfl: 6    oxybutynin (DITROPAN) 5 MG Tab, Take 2 tablets as needed for bladder, Disp: 60 tablet, Rfl: 3    oxybutynin (DITROPAN-XL) 10 MG 24 hr tablet, Take 1 tablet (10 mg total) by mouth once  "daily., Disp: 30 tablet, Rfl: 3    vitamin D (VITAMIN D3) 1000 units Tab, Take 1,000 Units by mouth 2 (two) times a day., Disp: , Rfl:     celecoxib (CELEBREX) 200 MG capsule, Take 1 capsule (200 mg total) by mouth 2 (two) times daily. (Patient not taking: Reported on 11/9/2023), Disp: 60 capsule, Rfl: 2    cyclobenzaprine (FLEXERIL) 5 MG tablet, Take 5 mg by mouth. (Patient not taking: Reported on 7/9/2024), Disp: , Rfl:     doxycycline (VIBRAMYCIN) 100 MG Cap, 1 po qd with food x 2-4 wks prn rosacea flare (Patient not taking: Reported on 7/9/2024), Disp: 30 capsule, Rfl: 3    finasteride (PROSCAR) 5 mg tablet, 1 po daily, Disp: 90 tablet, Rfl: 3    Review of Systems   Constitutional:  Negative for appetite change, chills, fatigue, fever and unexpected weight change.   HENT:  Negative for ear discharge and ear pain.    Eyes:  Negative for pain, discharge and visual disturbance.   Respiratory:  Negative for apnea, cough, shortness of breath and wheezing.    Cardiovascular:  Negative for chest pain, palpitations and leg swelling.   Gastrointestinal:  Negative for abdominal pain, blood in stool, constipation, diarrhea, nausea, vomiting and reflux.   Endocrine: Negative for cold intolerance, heat intolerance and polydipsia.   Genitourinary:  Positive for difficulty urinating. Negative for bladder incontinence, dysuria, hematuria, nocturia and urgency.   Musculoskeletal:  Negative for gait problem, joint swelling and myalgias.   Neurological:  Negative for dizziness, seizures and numbness.   Psychiatric/Behavioral:  Positive for dysphoric mood. Negative for behavioral problems and hallucinations. The patient is nervous/anxious.            Objective:      Vitals:    07/09/24 0817   BP: 118/84   Pulse: 82   SpO2: 97%   Weight: 64.1 kg (141 lb 4 oz)   Height: 5' 4" (1.626 m)     Physical Exam  Vitals and nursing note reviewed.   Constitutional:       General: She is not in acute distress.     Appearance: Normal appearance. " She is well-developed. She is not toxic-appearing.   HENT:      Head: Normocephalic and atraumatic.      Right Ear: Tympanic membrane and external ear normal.      Left Ear: Tympanic membrane and external ear normal.      Nose: Nose normal.      Mouth/Throat:      Pharynx: Oropharynx is clear.   Eyes:      Pupils: Pupils are equal, round, and reactive to light.   Neck:      Thyroid: No thyromegaly.      Vascular: No carotid bruit.   Cardiovascular:      Rate and Rhythm: Normal rate and regular rhythm.      Heart sounds: Normal heart sounds. No murmur heard.  Pulmonary:      Effort: Pulmonary effort is normal.      Breath sounds: Normal breath sounds. No wheezing or rales.   Abdominal:      General: Bowel sounds are normal. There is no distension.      Palpations: Abdomen is soft.      Tenderness: There is no abdominal tenderness.   Musculoskeletal:         General: No tenderness or deformity. Normal range of motion.      Cervical back: Normal range of motion and neck supple.      Lumbar back: Normal. No spasms.      Comments: Bends 90 degrees at  waist, shoulders good range of motion right knee full range of motion without clicking, not tender to palpation today no pitting edema to lower   Lymphadenopathy:      Cervical: No cervical adenopathy.   Skin:     General: Skin is warm and dry.      Findings: No rash.   Neurological:      Mental Status: She is alert and oriented to person, place, and time. Mental status is at baseline.      Cranial Nerves: No cranial nerve deficit.      Coordination: Coordination normal.   Psychiatric:         Mood and Affect: Mood is anxious.         Behavior: Behavior normal.         Thought Content: Thought content normal.         Judgment: Judgment normal.           Assessment:       1. Overactive bladder    2. Generalized anxiety disorder    3. Mixed hyperlipidemia    4. Age-related osteoporosis without current pathological fracture    5. Gastroesophageal reflux disease without  esophagitis    6. Rotator cuff impingement syndrome of right shoulder    7. Alopecia         Plan:       Overactive bladder  Recommend she go back to see uro gynecologist Dr. James Owens to see if other medications would be  more beneficial  Generalized anxiety disorder  Under stress from her parents  Mixed hyperlipidemia  Cholesterol 226 TG 56  mild elevation  Age-related osteoporosis without current pathological fracture  Continue calcium and 2000 IU vitamin-D daily . still suggest alendronate, she is fearful of side effects  Gastroesophageal reflux disease without esophagitis  Stable at this  Rotator cuff impingement syndrome of right shoulder  Improved with cortisone injection  Alopecia  Consider finasteride per dermatologist    Follow up in about 6 months (around 1/9/2025), or hld  OAB.        7/10/2024 Fan Bosch

## 2024-07-12 ENCOUNTER — TELEPHONE (OUTPATIENT)
Dept: FAMILY MEDICINE | Facility: CLINIC | Age: 64
End: 2024-07-12
Payer: MEDICARE

## 2024-07-12 NOTE — TELEPHONE ENCOUNTER
----- Message from Jenny Shah MA sent at 7/12/2024  8:04 AM CDT -----  Pt is calling to get results on her Cholesterol from last week states she see other results except that one     Pt # [phone]

## 2024-07-15 ENCOUNTER — PATIENT MESSAGE (OUTPATIENT)
Dept: FAMILY MEDICINE | Facility: CLINIC | Age: 64
End: 2024-07-15
Payer: MEDICARE

## 2024-07-15 NOTE — TELEPHONE ENCOUNTER
"Per Dr. Bosch, "Kidneys, sugar, liver and thyroid are normal. You cholesterol is high at 250, to high. Triglycerides are normal at 71. Recommend add rosuvastatin 5 mg to take Monday, Wednesday and Friday.  Quantity 36 with 1 refill. We will recheck your CMP and Lipids in 6 months."  "

## 2024-07-16 ENCOUNTER — PATIENT MESSAGE (OUTPATIENT)
Dept: FAMILY MEDICINE | Facility: CLINIC | Age: 64
End: 2024-07-16
Payer: MEDICARE

## 2024-07-16 DIAGNOSIS — F41.1 GENERALIZED ANXIETY DISORDER: ICD-10-CM

## 2024-07-16 RX ORDER — ROSUVASTATIN CALCIUM 5 MG/1
5 TABLET, COATED ORAL
Qty: 36 TABLET | Refills: 1 | Status: SHIPPED | OUTPATIENT
Start: 2024-07-17

## 2024-07-22 RX ORDER — BUSPIRONE HYDROCHLORIDE 10 MG/1
10 TABLET ORAL 3 TIMES DAILY PRN
Qty: 30 TABLET | Refills: 3 | Status: SHIPPED | OUTPATIENT
Start: 2024-07-22

## 2024-10-23 RX ORDER — CELECOXIB 200 MG/1
200 CAPSULE ORAL 2 TIMES DAILY
Qty: 60 CAPSULE | Refills: 2 | Status: SHIPPED | OUTPATIENT
Start: 2024-10-23

## 2024-12-04 DIAGNOSIS — Z12.31 ENCOUNTER FOR SCREENING MAMMOGRAM FOR MALIGNANT NEOPLASM OF BREAST: Primary | ICD-10-CM

## 2024-12-16 RX ORDER — ROSUVASTATIN CALCIUM 5 MG/1
5 TABLET, COATED ORAL
Qty: 36 TABLET | Refills: 1 | Status: SHIPPED | OUTPATIENT
Start: 2024-12-16

## 2024-12-27 ENCOUNTER — HOSPITAL ENCOUNTER (OUTPATIENT)
Dept: RADIOLOGY | Facility: HOSPITAL | Age: 64
Discharge: HOME | End: 2024-12-27
Attending: FAMILY MEDICINE
Payer: MEDICARE

## 2024-12-27 VITALS — BODY MASS INDEX: 24.07 KG/M2 | WEIGHT: 141 LBS | HEIGHT: 64 IN

## 2024-12-27 DIAGNOSIS — Z12.31 ENCOUNTER FOR SCREENING MAMMOGRAM FOR MALIGNANT NEOPLASM OF BREAST: ICD-10-CM

## 2024-12-27 PROCEDURE — 77067 SCR MAMMO BI INCL CAD: CPT | Mod: 26,,, | Performed by: RADIOLOGY

## 2024-12-27 PROCEDURE — 77063 BREAST TOMOSYNTHESIS BI: CPT | Mod: 26,,, | Performed by: RADIOLOGY

## 2024-12-27 PROCEDURE — 77067 SCR MAMMO BI INCL CAD: CPT | Mod: TC,PO

## 2025-01-09 ENCOUNTER — TELEPHONE (OUTPATIENT)
Dept: FAMILY MEDICINE | Facility: CLINIC | Age: 65
End: 2025-01-09
Payer: MEDICARE

## 2025-01-29 ENCOUNTER — OFFICE VISIT (OUTPATIENT)
Dept: FAMILY MEDICINE | Facility: CLINIC | Age: 65
End: 2025-01-29
Payer: MEDICARE

## 2025-01-29 VITALS
HEIGHT: 65 IN | OXYGEN SATURATION: 99 % | WEIGHT: 143 LBS | BODY MASS INDEX: 23.82 KG/M2 | HEART RATE: 67 BPM | SYSTOLIC BLOOD PRESSURE: 116 MMHG | DIASTOLIC BLOOD PRESSURE: 80 MMHG

## 2025-01-29 DIAGNOSIS — M75.41 ROTATOR CUFF IMPINGEMENT SYNDROME OF RIGHT SHOULDER: ICD-10-CM

## 2025-01-29 DIAGNOSIS — K59.01 SLOW TRANSIT CONSTIPATION: ICD-10-CM

## 2025-01-29 DIAGNOSIS — N32.81 OVERACTIVE BLADDER: ICD-10-CM

## 2025-01-29 DIAGNOSIS — F51.01 PRIMARY INSOMNIA: ICD-10-CM

## 2025-01-29 DIAGNOSIS — M81.0 AGE-RELATED OSTEOPOROSIS WITHOUT CURRENT PATHOLOGICAL FRACTURE: ICD-10-CM

## 2025-01-29 DIAGNOSIS — L65.9 ALOPECIA: ICD-10-CM

## 2025-01-29 DIAGNOSIS — K21.9 GASTROESOPHAGEAL REFLUX DISEASE WITHOUT ESOPHAGITIS: ICD-10-CM

## 2025-01-29 DIAGNOSIS — E78.2 MIXED HYPERLIPIDEMIA: Primary | ICD-10-CM

## 2025-01-29 PROCEDURE — 99214 OFFICE O/P EST MOD 30 MIN: CPT | Mod: S$GLB,,, | Performed by: FAMILY MEDICINE

## 2025-01-29 NOTE — PROGRESS NOTES
SUBJECTIVE:    Patient ID: Ani Matthews is a 64 y.o. female.    Chief Complaint: Hyperlipidemia (Review Lab-results, went over meds verbally, abc )    Hyperlipidemia  Pertinent negatives include no chest pain, myalgias or shortness of breath.       History of Present Illness    CHIEF COMPLAINT:  Ani presents today for follow-up and discussion about her parents' health issues.    CAREGIVER STRESS:  She expresses concerns about managing her parents' health and care. Her mother experiences behavioral issues including confusion when out of her usual environment and rapid mood swings between happiness and anger throughout the day. She reports difficulty managing her mother's behavior during a recent stay at her house due to a broken pipe at her parents' home. While her mother's current medication is working, she indicates needing additional medication for emergency situations. Her father is more accepting of his condition and content with inactivity, though she expresses frustration with his inability to perform household tasks. Her  has minimal involvement in her parents' care.    ANXIETY:  She experiences occasional episodes of increased anxiety, particularly during crises, and uses Buspar as needed when feeling nervous.    MUSCULOSKELETAL:  She reports shoulder pain with limited range of motion and difficulty touching her back with the affected arm, suspecting a possible tear. Previous physical therapy 3 times weekly was helpful but she does not want to continue long-term. While the shoulder improved significantly after therapy, it becomes irritated with overuse. She has been prescribed Celebrex for shoulder pain but is reluctant to take it regularly.    GASTROINTESTINAL:  She reports occasional heartburn managed with Mylanta, and constipation managed with Miralax.  2024-colonoscopy with Dr. Hammond[facility] 5 years    MEDICATIONS:  She continues finasteride for hair loss, which has helped reduce hair loss  rate.    PREVENTIVE CARE:  Last mammogram in December was normal. Last colonoscopy in 2024 showed no polyps. Last Pap smear was approximately 4 years ago, prior to COVID-19 pandemic.    EXERCISE:  She walks 20-30 minutes daily, covering approximately one mile.    LABS:  Cholesterol was 156.      ROS:  Constitutional: -appetite change, -chills, -fatigue, -fever, -unexpected weight change  HENT: -ear pain, -trouble swallowing  Eyes: -pain, -discharge, -visual disturbance  Respiratory: -apnea, -cough, -shortness of breath, -wheezing  Cardiovascular: -chest pain, -leg swelling  Gastrointestinal: -abdominal pain, -blood in stool, +constipation, -diarrhea, -nausea, -vomiting, +reflux  Endocrine: -cold intolerance, -heat intolerance, -polydipsia  Genitourinary: -bladder incontinence, -dysuria, -erectile dysfunction, -frequency, -hematuria, -testicular pain, -urgency, -nocturia  Musculoskeletal: -gait problem, -joint swelling, -myalgia, +joint pain  Neurological: -dizziness, -seizures, -numbness  Psychiatric/Behavioral: -agitation, -hallucinations, -nervous, +anxiety symptoms         No visits with results within 6 Month(s) from this visit.   Latest known visit with results is:   Office Visit on 07/09/2024   Component Date Value Ref Range Status    Glucose 01/14/2025 92  65 - 99 mg/dL Final    BUN 01/14/2025 19  7 - 25 mg/dL Final    Creatinine 01/14/2025 0.82  0.50 - 1.05 mg/dL Final    eGFR 01/14/2025 79  > OR = 60 mL/min/1.73m2 Final    BUN/Creatinine Ratio 01/14/2025 SEE NOTE:  6 - 22 (calc) Final    Sodium 01/14/2025 141  135 - 146 mmol/L Final    Potassium 01/14/2025 4.4  3.5 - 5.3 mmol/L Final    Chloride 01/14/2025 104  98 - 110 mmol/L Final    CO2 01/14/2025 29  20 - 32 mmol/L Final    Calcium 01/14/2025 9.4  8.6 - 10.4 mg/dL Final    Total Protein 01/14/2025 6.9  6.1 - 8.1 g/dL Final    Albumin 01/14/2025 4.6  3.6 - 5.1 g/dL Final    Globulin, Total 01/14/2025 2.3  1.9 - 3.7 g/dL (calc) Final    Albumin/Globulin  Ratio 01/14/2025 2.0  1.0 - 2.5 (calc) Final    Total Bilirubin 01/14/2025 0.6  0.2 - 1.2 mg/dL Final    Alkaline Phosphatase 01/14/2025 107  37 - 153 U/L Final    AST 01/14/2025 17  10 - 35 U/L Final    ALT 01/14/2025 6  6 - 29 U/L Final    Cholesterol 01/14/2025 156  <200 mg/dL Final    HDL 01/14/2025 65  > OR = 50 mg/dL Final    Triglycerides 01/14/2025 62  <150 mg/dL Final    LDL Cholesterol 01/14/2025 77  mg/dL (calc) Final    HDL/Cholesterol Ratio 01/14/2025 2.4  <5.0 (calc) Final    Non HDL Chol. (LDL+VLDL) 01/14/2025 91  <130 mg/dL (calc) Final       Past Medical History:   Diagnosis Date    Osteoporosis, unspecified     Overactive bladder      Social History     Socioeconomic History    Marital status:    Tobacco Use    Smoking status: Never    Smokeless tobacco: Never     Past Surgical History:   Procedure Laterality Date    COLONOSCOPY  2018    dr geronimo- rtc 5 yr     Family History   Problem Relation Name Age of Onset    Breast cancer Paternal Aunt         The 10-year CVD risk score (D'Agostino, et al., 2008) is: 4.3%    Values used to calculate the score:      Age: 64 years      Sex: Female      Diabetic: No      Tobacco smoker: No      Systolic Blood Pressure: 116 mmHg      Is BP treated: No      HDL Cholesterol: 65 mg/dL      Total Cholesterol: 156 mg/dL    All of your core healthy metrics are met.      Review of patient's allergies indicates:  No Known Allergies    Current Outpatient Medications:     busPIRone (BUSPAR) 10 MG tablet, Take 1 tablet (10 mg total) by mouth 3 (three) times daily as needed., Disp: 30 tablet, Rfl: 3    calcium carbonate (OS-WALDO) 600 mg calcium (1,500 mg) Tab, Take 600 mg by mouth 2 (two) times daily with meals., Disp: , Rfl:     calcium citrate-vitamin D3 315-200 mg (CITRACAL+D) 315 mg-5 mcg (200 unit) per tablet, 1 tablet with meals, Disp: , Rfl:     clotrimazole-betamethasone 1-0.05% (LOTRISONE) cream, Apply topically 2 (two) times daily. Use 3-4 days prn flare of  flaking/peeling, Disp: 45 g, Rfl: 1    finasteride (PROSCAR) 5 mg tablet, 1 po daily, Disp: 90 tablet, Rfl: 3    ketoconazole (NIZORAL) 2 % cream, AAA qd x 2-4 wks, Disp: 60 g, Rfl: 11    ketoconazole (NIZORAL) 2 % shampoo, Wash all scaling areas let sit 3 minutes then rinse 3x/wk, Disp: 120 mL, Rfl: 11    metronidazole 0.75% (METROCREAM) 0.75 % Crea, Apply topically 2 (two) times daily. For rosacea, Disp: 45 g, Rfl: 6    oxybutynin (DITROPAN) 5 MG Tab, Take 2 tablets as needed for bladder, Disp: 60 tablet, Rfl: 3    rosuvastatin (CRESTOR) 5 MG tablet, Take 1 tablet (5 mg total) by mouth every Mon, Wed, Fri., Disp: 36 tablet, Rfl: 1    vitamin D (VITAMIN D3) 1000 units Tab, Take 1,000 Units by mouth 2 (two) times a day., Disp: , Rfl:     celecoxib (CELEBREX) 200 MG capsule, Take 1 capsule (200 mg total) by mouth 2 (two) times daily., Disp: 60 capsule, Rfl: 2    cyclobenzaprine (FLEXERIL) 5 MG tablet, Take 5 mg by mouth. (Patient not taking: Reported on 7/9/2024), Disp: , Rfl:     doxycycline (VIBRAMYCIN) 100 MG Cap, 1 po qd with food x 2-4 wks prn rosacea flare (Patient not taking: Reported on 7/9/2024), Disp: 30 capsule, Rfl: 3    Review of Systems   Constitutional:  Negative for appetite change, chills, fatigue, fever and unexpected weight change.   HENT:  Negative for ear discharge and ear pain.    Eyes:  Negative for pain, discharge and visual disturbance.   Respiratory:  Negative for apnea, cough, shortness of breath and wheezing.    Cardiovascular:  Negative for chest pain, palpitations and leg swelling.   Gastrointestinal:  Positive for constipation. Negative for abdominal pain, blood in stool, diarrhea, nausea, vomiting and reflux.   Endocrine: Negative for cold intolerance, heat intolerance and polydipsia.   Genitourinary:  Positive for frequency. Negative for bladder incontinence, dysuria, hematuria, nocturia and urgency.   Musculoskeletal:  Positive for arthralgias (Right shoulder pain on abduction).  "Negative for gait problem, joint swelling and myalgias.   Neurological:  Negative for dizziness, seizures and numbness.   Psychiatric/Behavioral:  Negative for behavioral problems and hallucinations. The patient is not nervous/anxious.            Objective:      Vitals:    01/29/25 0826   BP: 116/80   Pulse: 67   SpO2: 99%   Weight: 64.9 kg (143 lb)   Height: 5' 4.75" (1.645 m)     Physical Exam  Vitals and nursing note reviewed.   Constitutional:       General: She is not in acute distress.     Appearance: Normal appearance. She is well-developed. She is not toxic-appearing.   HENT:      Head: Normocephalic and atraumatic.      Right Ear: Tympanic membrane and external ear normal.      Left Ear: Tympanic membrane and external ear normal.      Nose: Nose normal.      Mouth/Throat:      Pharynx: Oropharynx is clear. No posterior oropharyngeal erythema.   Eyes:      Pupils: Pupils are equal, round, and reactive to light.   Neck:      Thyroid: No thyromegaly.      Vascular: No carotid bruit.   Cardiovascular:      Rate and Rhythm: Normal rate and regular rhythm.      Heart sounds: Normal heart sounds. No murmur heard.  Pulmonary:      Effort: Pulmonary effort is normal.      Breath sounds: Normal breath sounds. No wheezing or rales.   Abdominal:      General: Bowel sounds are normal. There is no distension.      Palpations: Abdomen is soft.      Tenderness: There is no abdominal tenderness.   Musculoskeletal:         General: No tenderness or deformity. Normal range of motion.      Cervical back: Normal range of motion and neck supple.      Lumbar back: Normal. No spasms.      Comments: Bends 90 degrees at  waist,  knees have full range of motion, no pitting edema to lower extremities, right shoulder has pain at 90° aabduction, decreased internal rotation, both knees are crepitant   Lymphadenopathy:      Cervical: No cervical adenopathy.   Skin:     General: Skin is warm and dry.      Findings: No rash.   Neurological: "      General: No focal deficit present.      Mental Status: She is alert and oriented to person, place, and time. Mental status is at baseline.      Cranial Nerves: No cranial nerve deficit.      Coordination: Coordination normal.   Psychiatric:         Mood and Affect: Mood normal.         Behavior: Behavior normal.         Thought Content: Thought content normal.         Judgment: Judgment normal.       Physical Exam    Musculoskeletal: Knees click.  Vitals: BMI: 23.  MSK: Shoulder - Right: Right shoulder pain at 90 degrees.             Assessment:       1. Mixed hyperlipidemia    2. Alopecia    3. Overactive bladder    4. Age-related osteoporosis without current pathological fracture    5. Gastroesophageal reflux disease without esophagitis    6. Rotator cuff impingement syndrome of right shoulder    7. Primary insomnia    8. Slow transit constipation         Plan:       Mixed hyperlipidemia    Alopecia    Overactive bladder    Age-related osteoporosis without current pathological fracture    Gastroesophageal reflux disease without esophagitis    Rotator cuff impingement syndrome of right shoulder    Primary insomnia    Slow transit constipation      Assessment & Plan    IMPRESSION:  - Evaluated shoulder pain, likely due to rotator cuff impingement; recommended conservative management with exercises and anti-inflammatory medication  - Assessed cholesterol management; current medication regimen appears effective  - Reviewed cancer screening status; patient up-to-date on mammograms and colonoscopy  - Considered Xanax for patient's mother during crisis situations to manage anxiety when out of her usual environment    ROTATOR CUFF TEAR (SUSPECTED):  - Evaluated the patient's shoulder, noting ongoing pain and limited range of motion, particularly when carrying heavy items or during sleep.  - Performed physical exam revealing limited range of motion in the affected shoulder.  - Ani cannot touch their backbone with  the affected arm, indicating rotator cuff impingement.  - Assessed the condition as a suspected rotator cuff tear based on symptoms and exam.  - Previous therapy helped but the patient is experiencing recurrence of symptoms.  - Educated the patient on the importance of maintaining range of motion exercises for the shoulder.  - Instructed the patient to perform rotator cuff exercises as provided in the printed handout.  - Advised the patient to apply heat to the shoulder before gentle stretching exercises.  - Continued Celebrex for shoulder inflammation; instructed to take for a few days when shoulder pain flares up.  - Provided a printed rotator cuff tear exercise handout for the patient.  - Recommend resuming home exercises with provided instruction sheet, using heat therapy, and taking Celebrex for inflammation as needed.  - Suggested considering corticosteroid injections if conservative measures fail to provide relief.    HEARTBURN:  - Noted that the patient reports experiencing occasional heartburn, particularly during periods of stress.  - Confirmed that the patient self-manages symptoms with OTC Mylanta as needed.    CONSTIPATION:  - Verified that the patient uses Miralax for constipation management.    HAIR LOSS:  - Evaluated the patient's hair loss, noting reported improvement with current medication.  - Continued finasteride (ProScar) for hair loss prevention and management.    COLON POLYPS:  - Reviewed the patient's colonoscopy history, noting a history of colon polyps with decreasing frequency in recent colonoscopies.  - Confirmed that the most recent colonoscopy performed by Dr. Hammond showed no polyps.  - Verified that the patient is on a 5-year follow-up plan.  - Scheduled the next colonoscopy for 5 years from the last one, which was performed last year.  - Explained the current guidelines for colonoscopy screening, including continuation until age 80.    WEIGHT MANAGEMENT:  - Noted that the patient  reports walking for exercise but dislikes other forms of exercise.  - Calculated the patient's BMI as 23, which is within the normal range.  - Acknowledged the patient's walking routine as good exercise for heart health and circulation.    RIGHT KNEE CLICKING:  - Evaluated the patient's right knee, noting minor clicking which has been present since teenage years.  - Assessed knee function as adequate based on the patient's ability to walk.    ANXIETY:  - Discussed the patient's significant stress from caring for elderly parents, particularly mother's behavioral issues during a recent stay at the patient's home.  - Acknowledged the stress of caregiving and discussed potential strategies for managing mother's anxiety during disruptions to   her routine.  - Continued Buspirone (Buspar) as needed for anxiety; current use is appropriate at 4 half tablets (5mg) per month maximum.  - Suggested considering Xanax for mother's anxiety during environmental changes and emergencies.    CARDIOVASCULAR HEALTH:  - Discussed the benefits of daily walking for cardiovascular health and circulation.  - Ani to continue daily walking routine of 20-30 minutes (approximately 1 mile).    FOLLOW UP:  - Follow up with Dr. Tenorio (who has taken over Dr. Razo's patients) for pap smear sometime this year.         Follow up in about 6 months (around 7/29/2025), or Hyperlipidemia rot. cuff impingement.        This note was generated with the assistance of ambient listening technology. Verbal consent was obtained by the patient and accompanying visitor(s) for the recording of patient appointment to facilitate this note. I attest to having reviewed and edited the generated note for accuracy, though some syntax or spelling errors may persist. Please contact the author of this note for any clarification.      1/29/2025 Fan Bosch

## 2025-02-03 ENCOUNTER — OFFICE VISIT (OUTPATIENT)
Dept: FAMILY MEDICINE | Facility: CLINIC | Age: 65
End: 2025-02-03
Payer: MEDICARE

## 2025-02-03 VITALS
HEIGHT: 64 IN | OXYGEN SATURATION: 97 % | HEART RATE: 110 BPM | DIASTOLIC BLOOD PRESSURE: 78 MMHG | WEIGHT: 145 LBS | SYSTOLIC BLOOD PRESSURE: 118 MMHG | RESPIRATION RATE: 18 BRPM | TEMPERATURE: 99 F | BODY MASS INDEX: 24.75 KG/M2

## 2025-02-03 DIAGNOSIS — J10.1 INFLUENZA A: Primary | ICD-10-CM

## 2025-02-03 LAB
CTP QC/QA: YES
CTP QC/QA: YES
MOLECULAR STREP A: NEGATIVE
POC MOLECULAR INFLUENZA A AGN: POSITIVE
POC MOLECULAR INFLUENZA B AGN: NEGATIVE

## 2025-02-03 PROCEDURE — 99213 OFFICE O/P EST LOW 20 MIN: CPT | Mod: S$GLB,,, | Performed by: PHYSICIAN ASSISTANT

## 2025-02-03 PROCEDURE — 87502 INFLUENZA DNA AMP PROBE: CPT | Mod: QW,,, | Performed by: PHYSICIAN ASSISTANT

## 2025-02-03 PROCEDURE — 87651 STREP A DNA AMP PROBE: CPT | Mod: QW,,, | Performed by: PHYSICIAN ASSISTANT

## 2025-02-03 RX ORDER — OSELTAMIVIR PHOSPHATE 75 MG/1
75 CAPSULE ORAL 2 TIMES DAILY
Qty: 10 CAPSULE | Refills: 0 | Status: SHIPPED | OUTPATIENT
Start: 2025-02-03 | End: 2025-02-08

## 2025-02-03 NOTE — PROGRESS NOTES
SUBJECTIVE:    Patient ID: Ani Matthews is a 64 y.o. female.    Chief Complaint: Follow-up (No bottles// no refills needed// pt states she's been coughing,headaches,chills,sore throat,runny nose and fatigue no bodyaches no sweats going on for 2 days //grand kids have strep and flu pt did covid test at home which was negative )    Ms. Matthews is a 65 y/o F presenting with flu-like symptoms. She reports she began feeling sick 2 days ago with scratchy throat and runny nose. Her symptoms have been worsening since onset with increase rhinorrhea, productive cough, headaches and more frequent bowel movements. Recently exposed to grandkids who had flu and strep throat. At home covid test was negative. She has taken clorcidin with no relief. Denies CP or SOB.       No visits with results within 6 Month(s) from this visit.   Latest known visit with results is:   Office Visit on 07/09/2024   Component Date Value Ref Range Status    Glucose 01/14/2025 92  65 - 99 mg/dL Final    BUN 01/14/2025 19  7 - 25 mg/dL Final    Creatinine 01/14/2025 0.82  0.50 - 1.05 mg/dL Final    eGFR 01/14/2025 79  > OR = 60 mL/min/1.73m2 Final    BUN/Creatinine Ratio 01/14/2025 SEE NOTE:  6 - 22 (calc) Final    Sodium 01/14/2025 141  135 - 146 mmol/L Final    Potassium 01/14/2025 4.4  3.5 - 5.3 mmol/L Final    Chloride 01/14/2025 104  98 - 110 mmol/L Final    CO2 01/14/2025 29  20 - 32 mmol/L Final    Calcium 01/14/2025 9.4  8.6 - 10.4 mg/dL Final    Total Protein 01/14/2025 6.9  6.1 - 8.1 g/dL Final    Albumin 01/14/2025 4.6  3.6 - 5.1 g/dL Final    Globulin, Total 01/14/2025 2.3  1.9 - 3.7 g/dL (calc) Final    Albumin/Globulin Ratio 01/14/2025 2.0  1.0 - 2.5 (calc) Final    Total Bilirubin 01/14/2025 0.6  0.2 - 1.2 mg/dL Final    Alkaline Phosphatase 01/14/2025 107  37 - 153 U/L Final    AST 01/14/2025 17  10 - 35 U/L Final    ALT 01/14/2025 6  6 - 29 U/L Final    Cholesterol 01/14/2025 156  <200 mg/dL Final    HDL 01/14/2025 65  > OR = 50 mg/dL  Final    Triglycerides 01/14/2025 62  <150 mg/dL Final    LDL Cholesterol 01/14/2025 77  mg/dL (calc) Final    HDL/Cholesterol Ratio 01/14/2025 2.4  <5.0 (calc) Final    Non HDL Chol. (LDL+VLDL) 01/14/2025 91  <130 mg/dL (calc) Final       Past Medical History:   Diagnosis Date    Osteoporosis, unspecified     Overactive bladder      Past Surgical History:   Procedure Laterality Date    COLONOSCOPY  2018    dr geronimo- rtc 5 yr     Family History   Problem Relation Name Age of Onset    Breast cancer Paternal Aunt         All of your core healthy metrics are met.      The 10-year CVD risk score (PEDRO LUIS'Agoino, et al., 2008) is: 4.5%    Values used to calculate the score:      Age: 64 years      Sex: Female      Diabetic: No      Tobacco smoker: No      Systolic Blood Pressure: 118 mmHg      Is BP treated: No      HDL Cholesterol: 65 mg/dL      Total Cholesterol: 156 mg/dL     Marital Status:   Alcohol History:  has no history on file for alcohol use.  Tobacco History:  reports that she has never smoked. She has never used smokeless tobacco.  Drug History:  has no history on file for drug use.    Health Maintenance Topics with due status: Not Due       Topic Last Completion Date    TETANUS VACCINE 09/08/2022    DEXA Scan 11/29/2023    Colorectal Cancer Screening 02/05/2024    Mammogram 12/27/2024    Lipid Panel 01/14/2025    RSV Vaccine (Age 60+ and Pregnant patients) Not Due     Immunization History   Administered Date(s) Administered    COVID-19, MRNA, LN-S, PF (Pfizer) (Purple Cap) 06/11/2021, 07/02/2021    Influenza 10/06/2015    Influenza (FLUBLOK) - Quadrivalent - Recombinant - PF *Preferred* (egg allergy) 10/09/2019, 10/12/2020, 11/01/2021, 10/24/2022    Influenza - Quadrivalent - PF *Preferred* (6 months and older) 09/20/2016, 09/20/2016, 09/28/2017, 09/28/2017, 11/09/2023    Influenza - Trivalent - Afluria, Fluzone MDV 09/26/2014    Influenza - Trivalent - Fluarix, Flulaval, Fluzone, Afluria - PF  10/06/2015, 10/06/2015    Influenza - Trivalent - Flublok - PF (18 years and older) 10/04/2018, 10/04/2018, 10/04/2018    Td - PF (ADULT) 09/08/2022       Review of patient's allergies indicates:  No Known Allergies    Current Outpatient Medications:     busPIRone (BUSPAR) 10 MG tablet, Take 1 tablet (10 mg total) by mouth 3 (three) times daily as needed., Disp: 30 tablet, Rfl: 3    calcium carbonate (OS-WALDO) 600 mg calcium (1,500 mg) Tab, Take 600 mg by mouth 2 (two) times daily with meals., Disp: , Rfl:     calcium citrate-vitamin D3 315-200 mg (CITRACAL+D) 315 mg-5 mcg (200 unit) per tablet, 1 tablet with meals, Disp: , Rfl:     celecoxib (CELEBREX) 200 MG capsule, Take 1 capsule (200 mg total) by mouth 2 (two) times daily., Disp: 60 capsule, Rfl: 2    clotrimazole-betamethasone 1-0.05% (LOTRISONE) cream, Apply topically 2 (two) times daily. Use 3-4 days prn flare of flaking/peeling, Disp: 45 g, Rfl: 1    cyclobenzaprine (FLEXERIL) 5 MG tablet, Take 5 mg by mouth. (Patient not taking: Reported on 7/9/2024), Disp: , Rfl:     doxycycline (VIBRAMYCIN) 100 MG Cap, 1 po qd with food x 2-4 wks prn rosacea flare (Patient not taking: Reported on 7/9/2024), Disp: 30 capsule, Rfl: 3    finasteride (PROSCAR) 5 mg tablet, 1 po daily, Disp: 90 tablet, Rfl: 3    ketoconazole (NIZORAL) 2 % cream, AAA qd x 2-4 wks, Disp: 60 g, Rfl: 11    ketoconazole (NIZORAL) 2 % shampoo, Wash all scaling areas let sit 3 minutes then rinse 3x/wk, Disp: 120 mL, Rfl: 11    metronidazole 0.75% (METROCREAM) 0.75 % Crea, Apply topically 2 (two) times daily. For rosacea, Disp: 45 g, Rfl: 6    oseltamivir (TAMIFLU) 75 MG capsule, Take 1 capsule (75 mg total) by mouth 2 (two) times daily. for 5 days, Disp: 10 capsule, Rfl: 0    oxybutynin (DITROPAN) 5 MG Tab, Take 2 tablets as needed for bladder, Disp: 60 tablet, Rfl: 3    rosuvastatin (CRESTOR) 5 MG tablet, Take 1 tablet (5 mg total) by mouth every Mon, Wed, Fri., Disp: 36 tablet, Rfl: 1    vitamin D  "(VITAMIN D3) 1000 units Tab, Take 1,000 Units by mouth 2 (two) times a day., Disp: , Rfl:     Review of Systems   Constitutional:  Negative for activity change, appetite change, chills and fever.   HENT:  Positive for congestion, postnasal drip and rhinorrhea. Negative for ear pain and sore throat.    Eyes:  Negative for discharge and visual disturbance.   Respiratory:  Positive for cough. Negative for shortness of breath.    Cardiovascular:  Negative for chest pain.   Gastrointestinal:  Positive for nausea. Negative for abdominal pain, constipation, diarrhea and vomiting.   Genitourinary:  Negative for decreased urine volume and difficulty urinating.   Musculoskeletal:  Negative for myalgias.   Neurological:  Positive for headaches. Negative for dizziness, weakness and light-headedness.          Objective:      Vitals:    02/03/25 1030   BP: 118/78   Pulse: 110   Resp: 18   Temp: 99.3 °F (37.4 °C)   TempSrc: Oral   SpO2: 97%   Weight: 65.8 kg (145 lb)   Height: 5' 4" (1.626 m)     Physical Exam  Constitutional:       Appearance: Normal appearance.   HENT:      Head: Normocephalic and atraumatic.      Right Ear: Tympanic membrane normal.      Left Ear: Tympanic membrane normal.      Nose: Rhinorrhea present.      Mouth/Throat:      Mouth: Mucous membranes are moist.      Pharynx: No oropharyngeal exudate or posterior oropharyngeal erythema.   Eyes:      Extraocular Movements: Extraocular movements intact.      Pupils: Pupils are equal, round, and reactive to light.   Cardiovascular:      Rate and Rhythm: Regular rhythm. Tachycardia present.      Heart sounds: Normal heart sounds.   Pulmonary:      Effort: Pulmonary effort is normal.      Breath sounds: Normal breath sounds. No wheezing or rhonchi.   Abdominal:      General: Abdomen is flat.      Palpations: Abdomen is soft.   Musculoskeletal:         General: Normal range of motion.      Cervical back: Normal range of motion.   Skin:     General: Skin is warm.      " Capillary Refill: Capillary refill takes less than 2 seconds.   Neurological:      General: No focal deficit present.      Mental Status: She is alert and oriented to person, place, and time.   Psychiatric:         Mood and Affect: Mood normal.         Behavior: Behavior normal.           Assessment:       1. Influenza A           Plan:       1. Influenza A  Comments:  Will send in tamiful since symptom onset 2 days ago. Encouraged adequate hydration and ibuprofen/tylenol for fever. Reach out if sx worsen  Orders:  -     POCT Strep A, Molecular  -     POCT Influenza A/B Molecular  -     oseltamivir (TAMIFLU) 75 MG capsule; Take 1 capsule (75 mg total) by mouth 2 (two) times daily. for 5 days  Dispense: 10 capsule; Refill: 0      No follow-ups on file.          Counseled on age and gender appropriate medical preventative services, including cancer screenings, immunizations, overall nutritional health, need for a consistent exercise regimen and an overall push towards maintaining a vigorous and active lifestyle.      2/3/2025 Dakota Barros

## 2025-02-04 ENCOUNTER — PATIENT MESSAGE (OUTPATIENT)
Dept: FAMILY MEDICINE | Facility: CLINIC | Age: 65
End: 2025-02-04

## 2025-02-19 ENCOUNTER — PATIENT MESSAGE (OUTPATIENT)
Dept: FAMILY MEDICINE | Facility: CLINIC | Age: 65
End: 2025-02-19
Payer: MEDICARE

## 2025-02-22 DIAGNOSIS — Z00.00 ENCOUNTER FOR MEDICARE ANNUAL WELLNESS EXAM: ICD-10-CM

## 2025-04-14 ENCOUNTER — TELEPHONE (OUTPATIENT)
Dept: FAMILY MEDICINE | Facility: CLINIC | Age: 65
End: 2025-04-14
Payer: MEDICARE

## 2025-04-16 ENCOUNTER — OFFICE VISIT (OUTPATIENT)
Dept: FAMILY MEDICINE | Facility: CLINIC | Age: 65
End: 2025-04-16
Payer: MEDICARE

## 2025-04-16 VITALS
BODY MASS INDEX: 24.07 KG/M2 | HEART RATE: 80 BPM | OXYGEN SATURATION: 98 % | DIASTOLIC BLOOD PRESSURE: 68 MMHG | HEIGHT: 64 IN | SYSTOLIC BLOOD PRESSURE: 118 MMHG | WEIGHT: 141 LBS

## 2025-04-16 DIAGNOSIS — F41.1 GENERALIZED ANXIETY DISORDER: ICD-10-CM

## 2025-04-16 DIAGNOSIS — B00.9 HERPES SIMPLEX: Primary | ICD-10-CM

## 2025-04-16 PROCEDURE — 99213 OFFICE O/P EST LOW 20 MIN: CPT | Mod: S$GLB,,, | Performed by: FAMILY MEDICINE

## 2025-04-16 RX ORDER — VALACYCLOVIR HYDROCHLORIDE 1 G/1
1000 TABLET, FILM COATED ORAL 3 TIMES DAILY
Qty: 30 TABLET | Refills: 0 | Status: SHIPPED | OUTPATIENT
Start: 2025-04-16 | End: 2026-04-16

## 2025-04-16 NOTE — PROGRESS NOTES
"  SUBJECTIVE:    Patient ID: Ani Matthews is a 65 y.o. female.    Chief Complaint: sore on tailbone (No bottles//Pt is here for possible sore on tailbone x 5 days. Pt stated that it almost looks like shingles. Pt denies pain or discomfort//MARLEN )    HPI    History of Present Illness    CHIEF COMPLAINT:  Ani presents today with concerns about a skin lesion    SKIN:  She reports a single small patch of skin lesion with occasional mild itching. She denies pain associated with the lesion.    SLEEP:  She reports chronic sleep difficulties that predate her current stressors. She describes feeling constantly on "high alert" due to stress, which exacerbates her sleep issues.    FAMILY HISTORY:  Her father has a recent diagnosis of shingles. Her granddaughter (age 17) underwent a kidney transplant due to focal segmental glomerulosclerosis (FSGS), with the patient's son serving as the kidney donor.      ROS:  Constitutional: -appetite change, -chills, -fatigue, -fever, -unexpected weight change  HENT: -ear pain, -trouble swallowing  Eyes: -pain, -discharge, -visual disturbance  Respiratory: -apnea, -cough, -shortness of breath, -wheezing  Cardiovascular: -chest pain, -leg swelling  Gastrointestinal: -abdominal pain, -blood in stool, -constipation, -diarrhea, -nausea, -vomiting, -reflux  Endocrine: -cold intolerance, -heat intolerance, -polydipsia  Genitourinary: -bladder incontinence, -dysuria, -erectile dysfunction, -frequency, -hematuria, -testicular pain, -urgency, -nocturia  Musculoskeletal: -gait problem, -joint swelling, -myalgia  Neurological: -dizziness, -seizures, -numbness, +sleep difficulty  Psychiatric/Behavioral: -agitation, -hallucinations, -nervous, +anxiety symptoms  Skin: +itching, +skin lesion         Office Visit on 02/03/2025   Component Date Value Ref Range Status    Molecular Strep A, POC 02/03/2025 Negative  Negative Final     Acceptable 02/03/2025 Yes   Final    POC Molecular " "Influenza A Ag 02/03/2025 Positive (A)  Negative Final    POC Molecular Influenza B Ag 02/03/2025 Negative  Negative Final     Acceptable 02/03/2025 Yes   Final       Past Medical History:   Diagnosis Date    Osteoporosis, unspecified     Overactive bladder      Social History[1]  Past Surgical History:   Procedure Laterality Date    COLONOSCOPY  2018    dr geronimo- rtc 5 yr     Family History   Problem Relation Name Age of Onset    Breast cancer Paternal Aunt         The 10-year CVD risk score (Jordan, et al., 2008) is: 4.6%    Values used to calculate the score:      Age: 65 years      Sex: Female      Diabetic: No      Tobacco smoker: No      Systolic Blood Pressure: 118 mmHg      Is BP treated: No      HDL Cholesterol: 65 mg/dL      Total Cholesterol: 156 mg/dL    All of your core healthy metrics are met.      Review of patient's allergies indicates:  No Known Allergies  Current Medications[2]    Review of Systems   Integumentary:  Positive for rash (sacral blisters  x  5  days).           Objective:      Vitals:    04/16/25 0733   BP: 118/68   Pulse: 80   SpO2: 98%   Weight: 64 kg (141 lb)   Height: 5' 4" (1.626 m)     Physical Exam  Skin:     Comments: Sacral vesicles in a tight group midline.  No sign of cellulitis or decubiti.  No blisters in a dermatomal pattern around her hips       Physical Exam    Skin: Herpes simplex outbreak.             Assessment:       1. Herpes simplex    2. Generalized anxiety disorder         Plan:       Herpes simplex  -     valACYclovir (VALTREX) 1000 MG tablet; Take 1 tablet (1,000 mg total) by mouth 3 (three) times daily.  Dispense: 30 tablet; Refill: 0  Will treat with valacyclovir 1000 mg t.i.d. until healed  Generalized anxiety disorder  Discuss different options for caring for her aging elderly parents.  One with chronic atrial fibrillation and immobility and another 1 with Alzheimer's dementia.  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  She " occasionally takes BuSpar 10 mg for anxiety.    Assessment & Plan    B00.1 Herpesviral vesicular dermatitis  F51.02 Adjustment insomnia  Z84.1 Family history of disorders of kidney and ureter    IMPRESSION:  - Diagnosed herpes simplex outbreak, not shingles, based on presentation of single circular lesion rather than nerve root distribution.  - Attributed outbreak to high stress levels related to caregiving responsibilities.  - Determined antiviral treatment appropriate given symptoms and presentation.    HERPESVIRAL VESICULAR DERMATITIS:  - Examined the patient's skin lesion and diagnosed it as herpes simplex, not shingles.  - Noted that the lesion appears in a small [place] rather than wrapping around a nerve root.  - Explained the difference between herpes simplex and shingles, noting they are related viruses but present differently.  - Clarified that herpes simplex is not sexually transmitted in this context, but a stress-induced outbreak of a dormant virus.  - Discussed transmission risk, emphasizing contact precautions especially with immunocompromised granddaughter.  - Explained that outbreaks may recur during periods of high stress.  - Instructed the patient to wash hands frequently, especially after touching the affected area.  - Recommend allowing lesion to breathe and dry out naturally without bandaging.  - Advised the patient to take precautions to avoid direct contact between lesion and immunocompromised granddaughter.  - Prescribed Valtrex (valacyclovir) 3 times daily for 10 days to treat herpes simplex outbreak.    ADJUSTMENT INSOMNIA:  - Noted that the patient reports poor sleep quality, which is exacerbated by current stressful situation with parents.  - Acknowledged that the patient's sleep issues are related to stress from caregiving responsibilities.  - Recommend continuing to manage stress from caregiving situation as able.    FAMILY HISTORY OF KIDNEY DISORDERS:  - Noted that the patient's  granddaughter has had a kidney transplant due to Focal Segmental Glomerulosclerosis (FSGS), an autoimmune disease.  - Acknowledged the granddaughter's condition and immunosuppressed status.  - Advised caution regarding potential viral transmission to the immunocompromised granddaughter.         Follow up if symptoms worsen or fail to improve.        This note was generated with the assistance of ambient listening technology. Verbal consent was obtained by the patient and accompanying visitor(s) for the recording of patient appointment to facilitate this note. I attest to having reviewed and edited the generated note for accuracy, though some syntax or spelling errors may persist. Please contact the author of this note for any clarification.      4/16/2025 Fan Bosch           [1]   Social History  Socioeconomic History    Marital status:    Tobacco Use    Smoking status: Never    Smokeless tobacco: Never   [2]   Current Outpatient Medications:     busPIRone (BUSPAR) 10 MG tablet, Take 1 tablet (10 mg total) by mouth 3 (three) times daily as needed., Disp: 30 tablet, Rfl: 3    calcium carbonate (OS-WALDO) 600 mg calcium (1,500 mg) Tab, Take 600 mg by mouth 2 (two) times daily with meals., Disp: , Rfl:     calcium citrate-vitamin D3 315-200 mg (CITRACAL+D) 315 mg-5 mcg (200 unit) per tablet, 1 tablet with meals, Disp: , Rfl:     celecoxib (CELEBREX) 200 MG capsule, Take 1 capsule (200 mg total) by mouth 2 (two) times daily., Disp: 60 capsule, Rfl: 2    clotrimazole-betamethasone 1-0.05% (LOTRISONE) cream, Apply topically 2 (two) times daily. Use 3-4 days prn flare of flaking/peeling, Disp: 45 g, Rfl: 1    cyclobenzaprine (FLEXERIL) 5 MG tablet, Take 5 mg by mouth. (Patient not taking: Reported on 7/9/2024), Disp: , Rfl:     doxycycline (VIBRAMYCIN) 100 MG Cap, 1 po qd with food x 2-4 wks prn rosacea flare (Patient not taking: Reported on 7/9/2024), Disp: 30 capsule, Rfl: 3    finasteride (PROSCAR) 5 mg  tablet, 1 po daily, Disp: 90 tablet, Rfl: 3    ketoconazole (NIZORAL) 2 % cream, AAA qd x 2-4 wks, Disp: 60 g, Rfl: 11    ketoconazole (NIZORAL) 2 % shampoo, Wash all scaling areas let sit 3 minutes then rinse 3x/wk, Disp: 120 mL, Rfl: 11    metronidazole 0.75% (METROCREAM) 0.75 % Crea, Apply topically 2 (two) times daily. For rosacea, Disp: 45 g, Rfl: 6    oxybutynin (DITROPAN) 5 MG Tab, Take 2 tablets as needed for bladder, Disp: 60 tablet, Rfl: 3    rosuvastatin (CRESTOR) 5 MG tablet, Take 1 tablet (5 mg total) by mouth every Mon, Wed, Fri., Disp: 36 tablet, Rfl: 1    valACYclovir (VALTREX) 1000 MG tablet, Take 1 tablet (1,000 mg total) by mouth 3 (three) times daily., Disp: 30 tablet, Rfl: 0    vitamin D (VITAMIN D3) 1000 units Tab, Take 1,000 Units by mouth 2 (two) times a day., Disp: , Rfl:

## 2025-05-07 ENCOUNTER — PATIENT OUTREACH (OUTPATIENT)
Dept: ADMINISTRATIVE | Facility: HOSPITAL | Age: 65
End: 2025-05-07
Payer: MEDICARE

## 2025-05-07 NOTE — LETTER
AUTHORIZATION FOR RELEASE OF   CONFIDENTIAL INFORMATION        We are seeing Ani Matthews, date of birth 1960, in the clinic at [facility]. Fan Bosch MD is the patient's PCP. Ani Matthews has an outstanding lab/procedure at the time we reviewed her chart. In order to help keep her health information updated, she has authorized us to request the following medical record(s):                                              ( X )  EYE EXAM     24        Please fax records to [facility], Casey, Michael D., MD,  at [phone] or email to [email or web address].             Patient Name: Ani Matthews  : 1960  Patient Phone #: [phone]              Ani Matthews  MRN: [id number]  : 1960  Age: 64 y.o.  Sex: female         Patient/Legal Guardian Signature  This signature was collected at 2024    dc     Self  _______________________________   Printed Name/Relationship to Patient      Consent for Examination and Treatment: I hereby authorize the providers and employees of [facility] ([facility]) to provide medical treatment/services which includes, but is not limited to, performing and administering tests and diagnostic procedures that are deemed necessary, including, but not limited to, imaging examinations, blood tests and other laboratory procedures as may be required by the hospital, clinic, or may be ordered by my physician(s) or persons working under the general and/or special instructions of my physician(s).      I understand and agree that this consent covers all authorized persons, including but not limited to physicians, residents, nurse practitioners, physicians' assistants, specialists, consultants, student nurses, and independently contracted physicians, who are called upon by the physician in charge, to carry out the diagnostic procedures and medical or surgical treatment.     I hereby authorize [facility] to retain or dispose of  any specimens or tissue, should there be such remaining from any test or procedure.     I hereby authorize and give consent for [facility] providers and employees to take photographs, images or videotapes of such diagnostic, surgical or treatment procedures of Patient as may be required by [facility] or as may be ordered by a physician. I further acknowledge and agree that [facility] may use cameras or other devices for patient monitoring.     I am aware that the practice of medicine is not an exact science, and I acknowledge that no guarantees have been made to me as to the outcome of any tests, procedures or treatment.     Authorization for Release of Information: I understand that my insurance company and/or their agents may need information necessary to make determinations about payment/reimbursement. I hereby provide authorization to release to all insurance companies, their successors, assignees, other parties with whom they may have contracted, or others acting on their behalf, that are involved with payment for any hospital and/or clinic charges incurred by the patient, any information that they request and deem necessary for payment/reimbursement, and/or quality review.  I further authorize the release of my health information to physicians or other health care practitioners on staff who are involved in my health care now and in the future, and to other health care providers, entities, or institutions for the purpose of my continued care and treatment, including referrals.     REGISTRATION AUTHORIZATION  Form No. 59632 (Rev. 3/25/2024)    Page 1 of 3                       Medicare Patient's Certification and Authorization to Release Information and Payment Request:  I certify that the information given by me in applying for payment under Title XVIII of the Social Security Act is correct. I authorize any cooper of medical or other information about me to release to the Social SecurityAdministration, or its  intermediaries or carriers, any information needed for this or a related Medicare claim. I request that payment of authorized benefits be made on my behalf.     Assignment of Insurance Benefits:   I hereby authorize any and all insurance companies, health plans, defined   benefit plans, health insurers or any entity that is or may be responsible for payment of my medical expenses to pay all hospital and medical benefits now due, and to become due and payable to me under any hospital benefits, sick benefits, injury benefits or any other benefit for services rendered to me, including Major Medical Benefits, direct to [facility] and all independently contracted physicians. I assign any and all rights that I may have against any and all insurance companies, health plans, defined benefit plans, health insurers or any entity that is or may be responsible for payment of my medical expenses, including, but not limited to any right to appeal a denial of a claim, any right to bring any action, lawsuit, administrative proceeding, or other cause of action on my behalf. I specifically assign my right to pursue litigation against any and all insurance companies, health plans, defined benefit plans, health insurers or any entity that is or may be responsible for payment of my medical expenses based upon a refusal to pay charges.            E. Valuables: It is understood and agreed that [facility] is not liable for the damage to or loss of any money, jewelry,   documents, dentures, eye glasses, hearing aids, prosthetics, or other property of value.     F. Computer Equipment: I understand and agree that should I choose to use computer equipment owned by [facility] or if I choose to access the Internet via [facility]s network, I do so at my own risk. [facility] is not responsible for any damage to my computer equipment or to any damages of any type that might arise from my loss of equipment or data.     G. Acceptance of Financial Responsibility:   I agree that in consideration of the services and   supplies that have been   or will be furnished to the patient, I am hereby obligated to pay all charges made for or on the account of the patient according to the standard rates (in effect at the time the services and supplies are delivered) established by [facility], including its Patient Financial Assistance Policy to the extent it is applicable. I understand that I am responsible for all charges, or portions thereof, not covered by insurance or other sources. Patient refunds will be distributed only after balances at all [facility] facilities are paid.     H. Communication Authorization:  I hereby authorize [facility] and its representatives, along with any billing service   or  who may work on their behalf, to contact me on   my cell phone and/or home phone using pre- recorded messages, artificial voice messages, automatic telephone dialing devices or other computer assisted technology, or by electronic      mail, text messaging, or by any other form of electronic communication. This includes, but is not limited to, appointment reminders, yearly physical exam reminders, preventive care reminders, patient campaigns, welcome calls, and calls about account balances on my account or any account on which I am listed as a guarantor. I understand I have the right to opt out of these communications at any time.      Relationship  Between  Facility and  Provider:      I understand that some, but not all, providers furnishing services to the patient are not employees or agents of [facility]. The patient is under the care and supervision of his/her attending physician, and it is the responsibility of the facility and its nursing staff to carry out the instructions of such physicians. It is the responsibility of the patient's physician/designee to obtain the patient's informed consent, when required, for medical or surgical treatment, special diagnostic or therapeutic  procedures, or hospital services rendered for the patient under the special instructions of the physician/designee.           REGISTRATION AUTHORIZATION  Form No. 64531 (Rev. 3/25/2024)    Page 2 of 3                       Immunizations: [facility] shares immunization information with state sponsored health departments to help you and your doctor keep track of your immunization records. By signing, you consent to have this information shared with the health department in your state:                                [place] - LINKS ([place] Immunization Network for Kids Statewide)                                [place] - MIIX ([place] Immunization Information eXchange)                                [place] - ImmPRINT (Immunization Patient Registry with Integrated Technology)     TERM: This authorization is valid for this and subsequent care/treatment I receive at [facility] and will remain valid unless/until revoked in writing by me.     [facility]: As used in this document, [facility] means all [facility] owned and managed facilities, including, but not limited to, all health centers, surgery centers, clinics, urgent care centers, and hospitals.         [facility] complies with applicable Federal civil rights laws and does not discriminate on the basis of race, color, national origin, age, disability, or sex.  ATENCIÓN: si habla temiañol, tiene a parker disposición servicios gratuitos de asistencia lingüística. Vihsal al [phone].  CHÚ Ý: N?u b?n nói Ti?ng Vi?t, có các d?ch v? h? tr? ngôn ng? mi?n phí dành cho b?n. G?i s? [phone].        REGISTRATION AUTHORIZATION  Form No. 48981 (Rev. 3/25/2024)   Page 3 of 3     Patient

## 2025-05-07 NOTE — PROGRESS NOTES
External Record Received. Uploaded and hyper linked outside non-DM Eye Exam into [facility]     [place] for eye claim

## 2025-06-30 RX ORDER — ROSUVASTATIN CALCIUM 5 MG/1
5 TABLET, COATED ORAL
Qty: 36 TABLET | Refills: 1 | Status: SHIPPED | OUTPATIENT
Start: 2025-06-30

## 2025-07-24 NOTE — TELEPHONE ENCOUNTER
----- Message from [place] sent at 4/14/2025 10:49 AM CDT -----  Pt has sores on her tailbone and would like to come in as soon as possible.[phone]  
Pt has been scheduled   
24-Jul-2025

## 2025-08-06 ENCOUNTER — TELEPHONE (OUTPATIENT)
Dept: FAMILY MEDICINE | Facility: CLINIC | Age: 65
End: 2025-08-06
Payer: MEDICARE

## 2025-08-06 DIAGNOSIS — Z79.899 ENCOUNTER FOR LONG-TERM (CURRENT) USE OF MEDICATIONS: Primary | ICD-10-CM

## 2025-08-06 DIAGNOSIS — M81.0 AGE-RELATED OSTEOPOROSIS WITHOUT CURRENT PATHOLOGICAL FRACTURE: ICD-10-CM

## 2025-08-06 DIAGNOSIS — E78.2 MIXED HYPERLIPIDEMIA: ICD-10-CM

## 2025-08-20 ENCOUNTER — OFFICE VISIT (OUTPATIENT)
Dept: FAMILY MEDICINE | Facility: CLINIC | Age: 65
End: 2025-08-20
Payer: MEDICARE

## 2025-08-20 VITALS
DIASTOLIC BLOOD PRESSURE: 70 MMHG | HEART RATE: 81 BPM | WEIGHT: 142.63 LBS | OXYGEN SATURATION: 97 % | BODY MASS INDEX: 24.35 KG/M2 | HEIGHT: 64 IN | SYSTOLIC BLOOD PRESSURE: 130 MMHG

## 2025-08-20 DIAGNOSIS — Z78.0 MENOPAUSE: ICD-10-CM

## 2025-08-20 DIAGNOSIS — R31.9 HEMATURIA OF UNDIAGNOSED CAUSE: ICD-10-CM

## 2025-08-20 DIAGNOSIS — L65.9 ALOPECIA: ICD-10-CM

## 2025-08-20 DIAGNOSIS — F41.1 GENERALIZED ANXIETY DISORDER: ICD-10-CM

## 2025-08-20 DIAGNOSIS — R31.29 MICROSCOPIC HEMATURIA: ICD-10-CM

## 2025-08-20 DIAGNOSIS — F51.01 PRIMARY INSOMNIA: ICD-10-CM

## 2025-08-20 DIAGNOSIS — Z13.820 ENCOUNTER FOR IMAGING TO ASSESS OSTEOPOROSIS: ICD-10-CM

## 2025-08-20 DIAGNOSIS — Z12.31 OTHER SCREENING MAMMOGRAM: ICD-10-CM

## 2025-08-20 DIAGNOSIS — E78.2 MIXED HYPERLIPIDEMIA: Primary | ICD-10-CM

## 2025-08-20 DIAGNOSIS — N32.81 OVERACTIVE BLADDER: ICD-10-CM

## 2025-08-20 PROCEDURE — 99214 OFFICE O/P EST MOD 30 MIN: CPT | Mod: S$GLB,,, | Performed by: FAMILY MEDICINE

## 2025-08-20 RX ORDER — ROSUVASTATIN CALCIUM 5 MG/1
5 TABLET, COATED ORAL
Qty: 36 TABLET | Refills: 1 | Status: SHIPPED | OUTPATIENT
Start: 2025-08-20

## 2025-08-20 RX ORDER — OXYBUTYNIN CHLORIDE 10 MG/1
10 TABLET, EXTENDED RELEASE ORAL DAILY
Qty: 90 TABLET | Refills: 3 | Status: SHIPPED | OUTPATIENT
Start: 2025-08-20 | End: 2026-08-20

## 2025-08-20 RX ORDER — BUSPIRONE HYDROCHLORIDE 10 MG/1
10 TABLET ORAL 3 TIMES DAILY PRN
Qty: 30 TABLET | Refills: 3 | Status: SHIPPED | OUTPATIENT
Start: 2025-08-20